# Patient Record
Sex: FEMALE | Race: WHITE | NOT HISPANIC OR LATINO | Employment: UNEMPLOYED | URBAN - METROPOLITAN AREA
[De-identification: names, ages, dates, MRNs, and addresses within clinical notes are randomized per-mention and may not be internally consistent; named-entity substitution may affect disease eponyms.]

---

## 2019-03-26 ENCOUNTER — TELEPHONE (OUTPATIENT)
Dept: GASTROENTEROLOGY | Facility: CLINIC | Age: 34
End: 2019-03-26

## 2021-04-09 RX ORDER — SWAB
1 SWAB, NON-MEDICATED MISCELLANEOUS DAILY
COMMUNITY

## 2021-04-09 RX ORDER — TRAZODONE HYDROCHLORIDE 100 MG/1
100 TABLET ORAL
COMMUNITY

## 2021-04-12 ENCOUNTER — ROUTINE PRENATAL (OUTPATIENT)
Dept: OBGYN CLINIC | Facility: CLINIC | Age: 36
End: 2021-04-12

## 2021-04-12 VITALS
SYSTOLIC BLOOD PRESSURE: 80 MMHG | DIASTOLIC BLOOD PRESSURE: 50 MMHG | HEIGHT: 65 IN | WEIGHT: 124 LBS | BODY MASS INDEX: 20.66 KG/M2

## 2021-04-12 DIAGNOSIS — F32.A DEPRESSION AFFECTING PREGNANCY: ICD-10-CM

## 2021-04-12 DIAGNOSIS — O99.340 DEPRESSION AFFECTING PREGNANCY: ICD-10-CM

## 2021-04-12 DIAGNOSIS — O09.523 MULTIGRAVIDA OF ADVANCED MATERNAL AGE IN THIRD TRIMESTER: Primary | ICD-10-CM

## 2021-04-12 DIAGNOSIS — Z3A.34 34 WEEKS GESTATION OF PREGNANCY: ICD-10-CM

## 2021-04-12 PROBLEM — O98.519 COVID-19 AFFECTING PREGNANCY, ANTEPARTUM: Status: ACTIVE | Noted: 2021-04-12

## 2021-04-12 PROBLEM — O21.0 HYPEREMESIS GRAVIDARUM: Status: ACTIVE | Noted: 2021-04-12

## 2021-04-12 PROBLEM — U07.1 COVID-19 AFFECTING PREGNANCY, ANTEPARTUM: Status: ACTIVE | Noted: 2021-04-12

## 2021-04-12 PROBLEM — O09.529 ADVANCED MATERNAL AGE IN MULTIGRAVIDA: Status: ACTIVE | Noted: 2021-04-12

## 2021-04-12 PROBLEM — Z98.890 HISTORY OF LOOP ELECTROSURGICAL EXCISION PROCEDURE (LEEP): Status: ACTIVE | Noted: 2021-04-12

## 2021-04-12 LAB
SL AMB  POCT GLUCOSE, UA: NEGATIVE
SL AMB POCT URINE PROTEIN: NORMAL

## 2021-04-12 PROCEDURE — PNV: Performed by: STUDENT IN AN ORGANIZED HEALTH CARE EDUCATION/TRAINING PROGRAM

## 2021-04-12 NOTE — ASSESSMENT & PLAN NOTE
- PTL/PPROM/Bleeding precautions given  Kick counts reviewed  - Reviewed plan for collection of GBS swab at 36 weeks    - Problem list updated  - PMH, PSH, medications reviewed and updated as needed  - Return to office in 1-2 wk(s) for routine prenatal care

## 2021-04-12 NOTE — PATIENT INSTRUCTIONS
Pregnancy at 31 to 34 Weeks   AMBULATORY CARE:   What changes are happening to your body: You may continue to have symptoms such as shortness of breath, heartburn, contractions, or swelling of your ankles and feet  You may be gaining about 1 pound a week now  Seek care immediately if:   · You develop a severe headache that does not go away  · You have new or increased vision changes, such as blurred or spotted vision  · You have new or increased swelling in your face or hands  · You have vaginal spotting or bleeding  · Your water broke or you feel warm water gushing or trickling from your vagina  Contact your healthcare provider if:   · You have more than 5 contractions in 1 hour  · You notice any changes in your baby's movements  · You have abdominal cramps, pressure, or tightening  · You have a change in vaginal discharge  · You have chills or a fever  · You have vaginal itching, burning, or pain  · You have yellow, green, white, or foul-smelling vaginal discharge  · You have pain or burning when you urinate, less urine than usual, or pink or bloody urine  · You have questions or concerns about your condition or care  How to care for yourself at this stage of your pregnancy:   · Eat a variety of healthy foods  Healthy foods include fruits, vegetables, whole-grain breads, low-fat dairy foods, beans, lean meats, and fish  Drink liquids as directed  Ask how much liquid to drink each day and which liquids are best for you  Limit caffeine to less than 200 milligrams each day  Limit your intake of fish to 2 servings each week  Choose fish low in mercury such as canned light tuna, shrimp, salmon, cod, or tilapia  Do not  eat fish high in mercury such as swordfish, tilefish, kiran mackerel, and shark  · Manage heartburn  by eating 4 or 5 small meals each day instead of large meals  Avoid spicy food  · Manage swelling  by lying down and putting your feet up  · Take prenatal vitamins as directed  Your need for certain vitamins and minerals, such as folic acid, increases during pregnancy  Prenatal vitamins provide some of the extra vitamins and minerals you need  Prenatal vitamins may also help to decrease the risk of certain birth defects  · Talk to your healthcare provider about exercise  Moderate exercise can help you stay fit  Your healthcare provider will help you plan an exercise program that is safe for you during pregnancy  · Do not smoke  Smoking increases your risk of a miscarriage and other health problems during your pregnancy  Smoking can cause your baby to be born too early or weigh less at birth  Ask your healthcare provider for information if you need help quitting  · Do not drink alcohol  Alcohol passes from your body to your baby through the placenta  It can affect your baby's brain development and cause fetal alcohol syndrome (FAS)  FAS is a group of conditions that causes mental, behavior, and growth problems  · Talk to your healthcare provider before you take any medicines  Many medicines may harm your baby if you take them when you are pregnant  Do not take any medicines, vitamins, herbs, or supplements without first talking to your healthcare provider  Never use illegal or street drugs (such as marijuana or cocaine) while you are pregnant  Safety tips during pregnancy:   · Avoid hot tubs and saunas  Do not use a hot tub or sauna while you are pregnant, especially during your first trimester  Hot tubs and saunas may raise your baby's temperature and increase the risk of birth defects  · Avoid toxoplasmosis  This is an infection caused by eating raw meat or being around infected cat feces  It can cause birth defects, miscarriages, and other problems  Wash your hands after you touch raw meat  Make sure any meat is well-cooked before you eat it  Avoid raw eggs and unpasteurized milk   Use gloves or ask someone else to clean your cat's litter box while you are pregnant  Changes that are happening with your baby:  By 34 weeks, your baby may weigh more than 5 pounds  Your baby will be about 12 ½ inches long from the top of the head to the rump (baby's bottom)  Your baby is gaining about ½ pound a week  Your baby's eyes open and close now  Your baby's kicks and movements are more forceful at this time  What you need to know about prenatal care: Your healthcare provider will check your blood pressure and weight  You may also need the following:  · A urine test  may also be done to check for sugar and protein  These can be signs of gestational diabetes or infection  Protein in your urine may also be a sign of preeclampsia  Preeclampsia is a condition that can develop during week 20 or later of your pregnancy  It causes high blood pressure, and it can cause problems with your kidneys and other organs  · A Tdap vaccine  may be recommended by your healthcare provider  · Fundal height  is a measurement of your uterus to check your baby's growth  This number is usually the same as the number of weeks that you have been pregnant  Your healthcare provider may also check your baby's position  · Your baby's heart rate  will be checked  © Copyright [address] Information is for End User's use only and may not be sold, redistributed or otherwise used for commercial purposes  All illustrations and images included in CareNotes® are the copyrighted property of A D A M , Inc  or [facility] [place] Roxanne Tucker   The above information is an  only  It is not intended as medical advice for individual conditions or treatments  Talk to your doctor, nurse or pharmacist before following any medical regimen to see if it is safe and effective for you

## 2021-04-12 NOTE — PROGRESS NOTES
Routine Prenatal Visit  [address]   [address] [facility], [facility], [place] [address]    Assessment/Plan:  Kurt Silverio is a 29y o  year old  at 34w7d who presents for routine prenatal visit  3  Multigravida of advanced maternal age in third trimester  Assessment & Plan:  - PTL/PPROM/Bleeding precautions given  Kick counts reviewed  - Reviewed plan for collection of GBS swab at 36 weeks  - Problem list updated  - PMH, PSH, medications reviewed and updated as needed  - Return to office in 1-2 wk(s) for routine prenatal care        2  Depression affecting pregnancy    3  34 weeks gestation of pregnancy  -     POCT urine dip      Subjective:   Amelia Guerrero is a 29 y o  [id number] who presents for routine prenatal care at 34w6d  Complaints today: Mild low back pain, constant  LOF: No; VB: No; Contractions: No; FM: Normal    Objective:  BP (!) 80/50   Ht 5' 5" (1 651 m)   Wt 56 2 kg (124 lb)   LMP 2020   BMI 20 63 kg/m²     General: Well appearing, no distress  Respiratory: Unlabored breathing  Cardiovascular: Regular rate  Abdomen: Soft, gravid, nontender  Extremities: Warm and well perfused  Non tender      Pregravid Weight/BMI: 53 1 kg (117 lb) (BMI 19 47)  Current Weight: 56 2 kg (124 lb)   Total Weight Gain: 3 175 kg (7 lb)     Pre-Renny Vitals      Most Recent Value   Prenatal Assessment   Fetal Heart Rate  115   Fundal Height (cm)  34 cm   Movement  Present   Presentation  Vertex   Prenatal Vitals   Blood Pressure  (!) 80/50   Weight - Scale  56 2 kg (124 lb)   Urine Albumin/Glucose   Dilation/Effacement/Station   Vaginal Drainage   Draining Fluid  No   Edema   LLE Edema  None   RLE Edema  None   Facial Edema  None           Delmar Torrez MD  2021 12:59 PM

## 2021-04-21 ENCOUNTER — ROUTINE PRENATAL (OUTPATIENT)
Dept: OBGYN CLINIC | Facility: CLINIC | Age: 36
End: 2021-04-21

## 2021-04-21 VITALS
SYSTOLIC BLOOD PRESSURE: 100 MMHG | WEIGHT: 127 LBS | BODY MASS INDEX: 21.16 KG/M2 | HEIGHT: 65 IN | DIASTOLIC BLOOD PRESSURE: 50 MMHG

## 2021-04-21 DIAGNOSIS — O09.523 MULTIGRAVIDA OF ADVANCED MATERNAL AGE IN THIRD TRIMESTER: ICD-10-CM

## 2021-04-21 DIAGNOSIS — F32.A DEPRESSION AFFECTING PREGNANCY: ICD-10-CM

## 2021-04-21 DIAGNOSIS — O99.340 DEPRESSION AFFECTING PREGNANCY: ICD-10-CM

## 2021-04-21 DIAGNOSIS — O98.519 COVID-19 AFFECTING PREGNANCY, ANTEPARTUM: ICD-10-CM

## 2021-04-21 DIAGNOSIS — Z98.890 HISTORY OF LOOP ELECTROSURGICAL EXCISION PROCEDURE (LEEP): ICD-10-CM

## 2021-04-21 DIAGNOSIS — O21.0 HYPEREMESIS GRAVIDARUM: ICD-10-CM

## 2021-04-21 DIAGNOSIS — U07.1 COVID-19 AFFECTING PREGNANCY, ANTEPARTUM: ICD-10-CM

## 2021-04-21 DIAGNOSIS — Z34.93 PRENATAL CARE IN THIRD TRIMESTER: ICD-10-CM

## 2021-04-21 DIAGNOSIS — Z3A.36 36 WEEKS GESTATION OF PREGNANCY: Primary | ICD-10-CM

## 2021-04-21 LAB
SL AMB  POCT GLUCOSE, UA: NEGATIVE
SL AMB POCT URINE PROTEIN: NORMAL

## 2021-04-21 PROCEDURE — PNV: Performed by: OBSTETRICS & GYNECOLOGY

## 2021-04-21 NOTE — PROGRESS NOTES
Routine Prenatal Visit  [address]   [address] [facility], [facility], [place]    Assessment/Plan:  Vinita Frazier is a 29y o  year old  at 36w1d who presents for routine prenatal visit  1  36 weeks gestation of pregnancy  -     POCT urine dip  -     Strep B DNA probe, amplification    2  Multigravida of advanced maternal age in third trimester    3  Hyperemesis gravidarum    4  COVID-19 affecting pregnancy, antepartum    5  Depression affecting pregnancy    6  History of loop electrosurgical excision procedure (LEEP)    7  Prenatal care in third trimester          Subjective:     CC: Prenatal care    Abilio Amador is a 29 y o   female who presents for routine prenatal care at 36w1d  Pregnancy ROS: No leakage of fluid, pelvic pain, or vaginal bleeding  Nml fetal movement  The following portions of the patient's history were reviewed and updated as appropriate: allergies, current medications, past family history, past medical history, obstetric history, gynecologic history, past social history, past surgical history and problem list       Objective:  /50   Ht 5' 5" (1 651 m)   Wt 57 6 kg (127 lb)   LMP 2020   BMI 21 13 kg/m²   Pregravid Weight/BMI: 53 1 kg (117 lb) (BMI 19 47)  Current Weight: 57 6 kg (127 lb)   Total Weight Gain: 4 536 kg (10 lb)   Pre- Vitals      Most Recent Value   Prenatal Assessment   Fetal Heart Rate  140   Fundal Height (cm)  36 cm   Movement  Present   Presentation  Vertex   Prenatal Vitals   Blood Pressure  100/50   Weight - Scale  57 6 kg (127 lb)   Urine Albumin/Glucose   Dilation/Effacement/Station   Cervical Dilation  0   Cervical Effacement  0   Fetal Station  -1   Vaginal Drainage   Draining Fluid  No   Edema           General: Well appearing, no distress  Abdomen: Soft, gravid, nontender  Fundal Height: Appropriate for gestational age  Extremities: Warm and well perfused  Non tender

## 2021-04-23 LAB — GP B STREP DNA SPEC QL NAA+PROBE: NEGATIVE

## 2021-04-28 ENCOUNTER — ROUTINE PRENATAL (OUTPATIENT)
Dept: OBGYN CLINIC | Facility: CLINIC | Age: 36
End: 2021-04-28

## 2021-04-28 VITALS
DIASTOLIC BLOOD PRESSURE: 58 MMHG | BODY MASS INDEX: 20.83 KG/M2 | WEIGHT: 125 LBS | HEIGHT: 65 IN | SYSTOLIC BLOOD PRESSURE: 90 MMHG

## 2021-04-28 DIAGNOSIS — O21.0 HYPEREMESIS GRAVIDARUM: ICD-10-CM

## 2021-04-28 DIAGNOSIS — O09.523 MULTIGRAVIDA OF ADVANCED MATERNAL AGE IN THIRD TRIMESTER: ICD-10-CM

## 2021-04-28 DIAGNOSIS — U07.1 COVID-19 AFFECTING PREGNANCY, ANTEPARTUM: ICD-10-CM

## 2021-04-28 DIAGNOSIS — Z3A.37 37 WEEKS GESTATION OF PREGNANCY: Primary | ICD-10-CM

## 2021-04-28 DIAGNOSIS — O99.340 DEPRESSION AFFECTING PREGNANCY: ICD-10-CM

## 2021-04-28 DIAGNOSIS — O98.519 COVID-19 AFFECTING PREGNANCY, ANTEPARTUM: ICD-10-CM

## 2021-04-28 DIAGNOSIS — F32.A DEPRESSION AFFECTING PREGNANCY: ICD-10-CM

## 2021-04-28 DIAGNOSIS — Z98.890 HISTORY OF LOOP ELECTROSURGICAL EXCISION PROCEDURE (LEEP): ICD-10-CM

## 2021-04-28 LAB
SL AMB  POCT GLUCOSE, UA: NEGATIVE
SL AMB POCT URINE PROTEIN: NORMAL

## 2021-04-28 PROCEDURE — PNV: Performed by: OBSTETRICS & GYNECOLOGY

## 2021-04-28 NOTE — PROGRESS NOTES
Routine Prenatal Visit  [address], [address], [facility], [address] [facility]    Assessment/Plan:  Jessica Richardson is a 29y o  year old  at 37w1d who presents for routine prenatal visit  1  37 weeks gestation of pregnancy  -     POCT urine dip    2  Multigravida of advanced maternal age in third trimester    3  Hyperemesis gravidarum    4  COVID-19 affecting pregnancy, antepartum    5  Depression affecting pregnancy    6  History of loop electrosurgical excision procedure (LEEP)          Subjective:     CC: Prenatal care    Nani Ellison is a 29 y o   female who presents for routine prenatal care at 37w1d  Pregnancy ROS: No leakage of fluid, pelvic pain, or vaginal bleeding  Nml fetal movement  The following portions of the patient's history were reviewed and updated as appropriate: allergies, current medications, past family history, past medical history, obstetric history, gynecologic history, past social history, past surgical history and problem list       Objective:  BP 90/58   Ht 5' 5" (1 651 m)   Wt 56 7 kg (125 lb)   LMP 2020   BMI 20 80 kg/m²   Pregravid Weight/BMI: 53 1 kg (117 lb) (BMI 19 47)  Current Weight: 56 7 kg (125 lb)   Total Weight Gain: 3 629 kg (8 lb)   Pre- Vitals      Most Recent Value   Prenatal Assessment   Fetal Heart Rate  140-150   Fundal Height (cm)  37 cm   Movement  Present   Prenatal Vitals   Blood Pressure  90/58   Weight - Scale  56 7 kg (125 lb)   Urine Albumin/Glucose   Dilation/Effacement/Station   Cervical Dilation  1   Cervical Effacement  50   Fetal Station  -1   Vaginal Drainage   Draining Fluid  No   Edema           General: Well appearing, no distress  Abdomen: Soft, gravid, nontender  Fundal Height: Appropriate for gestational age  Extremities: Warm and well perfused  Non tender

## 2021-05-03 ENCOUNTER — TELEPHONE (OUTPATIENT)
Dept: OBGYN CLINIC | Facility: CLINIC | Age: 36
End: 2021-05-03

## 2021-05-03 ENCOUNTER — DOCUMENTATION (OUTPATIENT)
Dept: OBGYN CLINIC | Facility: CLINIC | Age: 36
End: 2021-05-03

## 2021-05-03 DIAGNOSIS — Z98.890 HISTORY OF LOOP ELECTROSURGICAL EXCISION PROCEDURE (LEEP): ICD-10-CM

## 2021-05-03 DIAGNOSIS — O98.519 COVID-19 AFFECTING PREGNANCY, ANTEPARTUM: ICD-10-CM

## 2021-05-03 DIAGNOSIS — F32.A DEPRESSION AFFECTING PREGNANCY: ICD-10-CM

## 2021-05-03 DIAGNOSIS — U07.1 COVID-19 AFFECTING PREGNANCY, ANTEPARTUM: ICD-10-CM

## 2021-05-03 DIAGNOSIS — O21.0 HYPEREMESIS GRAVIDARUM: ICD-10-CM

## 2021-05-03 DIAGNOSIS — O99.340 DEPRESSION AFFECTING PREGNANCY: ICD-10-CM

## 2021-05-03 DIAGNOSIS — O09.523 MULTIGRAVIDA OF ADVANCED MATERNAL AGE IN THIRD TRIMESTER: ICD-10-CM

## 2021-05-03 NOTE — TELEPHONE ENCOUNTER
Dr Malik Mora request this nurse to please fax to L/D at [facility] the OB delivery consent form  Faxed as requested with fax confirmation received

## 2021-05-03 NOTE — TELEPHONE ENCOUNTER
Patient called stating over the weekend she lost her mucus plug, then this morning she had some blood spotting with a lot of watery discharge (denies urine odor)  She states she having pelvic pressure and abdominal period time cramping that radiates to her lower back  Pt report good fetal movement  She did not call the on-call provider but was not sure if she should head to labor and delivery or not  Pt is currently 37 6 wks and   Advised pt to please proceed to the L/D unit at [facility] for further evaluation  Pt states she will have to find  first and will be there in about an hour  Called L/D at [facility] and spoke with a  and provided her with report  Epic pt's pregnancy episode printed and faxed to L/D at [facility] with fax confirmation

## 2021-05-05 ENCOUNTER — DOCUMENTATION (OUTPATIENT)
Dept: OBGYN CLINIC | Facility: CLINIC | Age: 36
End: 2021-05-05

## 2021-05-05 ENCOUNTER — ROUTINE PRENATAL (OUTPATIENT)
Dept: OBGYN CLINIC | Facility: CLINIC | Age: 36
End: 2021-05-05

## 2021-05-05 ENCOUNTER — TELEPHONE (OUTPATIENT)
Dept: OBGYN CLINIC | Facility: CLINIC | Age: 36
End: 2021-05-05

## 2021-05-05 VITALS
SYSTOLIC BLOOD PRESSURE: 100 MMHG | HEIGHT: 65 IN | WEIGHT: 123 LBS | BODY MASS INDEX: 20.49 KG/M2 | DIASTOLIC BLOOD PRESSURE: 62 MMHG

## 2021-05-05 DIAGNOSIS — O99.340 DEPRESSION AFFECTING PREGNANCY: ICD-10-CM

## 2021-05-05 DIAGNOSIS — O98.519 COVID-19 AFFECTING PREGNANCY, ANTEPARTUM: ICD-10-CM

## 2021-05-05 DIAGNOSIS — F32.A DEPRESSION AFFECTING PREGNANCY: ICD-10-CM

## 2021-05-05 DIAGNOSIS — Z98.890 HISTORY OF LOOP ELECTROSURGICAL EXCISION PROCEDURE (LEEP): ICD-10-CM

## 2021-05-05 DIAGNOSIS — O21.0 HYPEREMESIS GRAVIDARUM: ICD-10-CM

## 2021-05-05 DIAGNOSIS — O09.523 MULTIGRAVIDA OF ADVANCED MATERNAL AGE IN THIRD TRIMESTER: ICD-10-CM

## 2021-05-05 DIAGNOSIS — U07.1 COVID-19 AFFECTING PREGNANCY, ANTEPARTUM: ICD-10-CM

## 2021-05-05 DIAGNOSIS — Z3A.38 38 WEEKS GESTATION OF PREGNANCY: Primary | ICD-10-CM

## 2021-05-05 LAB
SL AMB  POCT GLUCOSE, UA: NEGATIVE
SL AMB POCT URINE PROTEIN: NEGATIVE

## 2021-05-05 PROCEDURE — PNV: Performed by: OBSTETRICS & GYNECOLOGY

## 2021-05-05 NOTE — PROGRESS NOTES
Patient seen and evaluated by me at [place] [address] and delivery for labor triage  She is a 28-year-old  at 38 weeks 1 day gestation presenting with irregular correct contractions throughout the day  She reports subjectively normal fetal movement  She denies leaking of fluid or vaginal bleeding  Patient was seen for routine prenatal care this morning in the office, at which time cervical exam was performed and patient was noted to be 4 cm dilated 80% effaced -2 station with membranes intact  She states that subsequent to her visit she had increase in frequency and intensity of contractions and therefore presents for labor evaluation  At the time of my initial evaluation at around 2:00 p m , cervical exam was unchanged from exam in the office 4 hours prior  The patient was monitored continuously for 3 hours over which time course she continued to have irregular contractions on the monitor  Repeat cervical exam thereafter revealed cervix unchanged  Patient therefore ruled out for labor with stable cervical exam over >7 hours  Given gestational age prior to 43 weeks, expected management is recommended  The patient was deemed stable for discharge home  Strict return precautions were reviewed, including instructions to call if contractions increase in regularity /frequency/ intensity, if onset of leaking of fluid should occur, or if patient should have vaginal bleeding  Kick counts reviewed  All questions answered  Patient agreeable to plan      Leroy Romo MD  2021 7:34 PM

## 2021-05-05 NOTE — TELEPHONE ENCOUNTER
Remi Mobley states was examined by Dr Jamaal Abad this morning  Remi Mobley said was 4cm dilated & the baby's head was very low  She said was advised to call right away once started to have contractions due to baby's head position  Contreras called c/o contractions every 4 5-5 minutes apart  Advised Contreras to go to [facility] L&D  Tien Mobley verbally agreed with plan  Informed [facility] L&D of pt  Symptoms & arrival  Remi Mobley was awaiting on  & in-laws for

## 2021-05-05 NOTE — PROGRESS NOTES
Routine Prenatal Visit  [address]   [address] [facility], [facility], [address]    Assessment/Plan:  Karyn Ribeiro is a 29y o  year old  at 43w4d who presents for routine prenatal visit  Pt states she has been having irregular CTXs  Was evaluated for LOF on 5/3/21 on L&D, denies having LOF since exam on Monday    1  38 weeks gestation of pregnancy  -     POCT urine dip   Si/sx of labor reviewed  Precautions given  2  Multigravida of advanced maternal age in third trimester    3  Hyperemesis gravidarum    4  COVID-19 affecting pregnancy, antepartum    5  Depression affecting pregnancy    6  History of loop electrosurgical excision procedure (LEEP)          Subjective:     CC: Prenatal care    Charito Langston is a 29 y o   female who presents for routine prenatal care at 38w1d  Pregnancy ROS: No leakage of fluid, pelvic pain, or vaginal bleeding  Nml fetal movement      The following portions of the patient's history were reviewed and updated as appropriate: allergies, current medications, past family history, past medical history, obstetric history, gynecologic history, past social history, past surgical history and problem list       Objective:  /62   Ht 5' 5" (1 651 m)   Wt 55 8 kg (123 lb)   LMP 2020   BMI 20 47 kg/m²   Pregravid Weight/BMI: 53 1 kg (117 lb) (BMI 19 47)  Current Weight: 55 8 kg (123 lb)   Total Weight Gain: 2 722 kg (6 lb)   Pre-Renny Vitals      Most Recent Value   Prenatal Assessment   Fetal Heart Rate  140   Fundal Height (cm)  38 cm   Movement  Present   Presentation  Vertex   Prenatal Vitals   Blood Pressure  100/62   Weight - Scale  55 8 kg (123 lb)   Urine Albumin/Glucose   Dilation/Effacement/Station   Cervical Dilation  3   Cervical Effacement  70   Fetal Station  0   Vaginal Drainage   Draining Fluid  No   Edema           General: Well appearing, no distress  Abdomen: Soft, gravid, nontender  Fundal Height: Appropriate for gestational age   Extremities: Warm and well perfused  Non tender

## 2021-05-06 ENCOUNTER — DOCUMENTATION (OUTPATIENT)
Dept: OBGYN CLINIC | Facility: CLINIC | Age: 36
End: 2021-05-06

## 2021-05-06 DIAGNOSIS — U07.1 COVID-19 AFFECTING PREGNANCY, ANTEPARTUM: ICD-10-CM

## 2021-05-06 DIAGNOSIS — Z98.890 HISTORY OF LOOP ELECTROSURGICAL EXCISION PROCEDURE (LEEP): ICD-10-CM

## 2021-05-06 DIAGNOSIS — F32.A DEPRESSION AFFECTING PREGNANCY: ICD-10-CM

## 2021-05-06 DIAGNOSIS — O09.523 MULTIGRAVIDA OF ADVANCED MATERNAL AGE IN THIRD TRIMESTER: Primary | ICD-10-CM

## 2021-05-06 DIAGNOSIS — O98.519 COVID-19 AFFECTING PREGNANCY, ANTEPARTUM: ICD-10-CM

## 2021-05-06 DIAGNOSIS — O99.340 DEPRESSION AFFECTING PREGNANCY: ICD-10-CM

## 2021-05-06 DIAGNOSIS — O21.0 HYPEREMESIS GRAVIDARUM: ICD-10-CM

## 2021-05-06 NOTE — PROGRESS NOTES
Patient admitted to [facility] in spontaneous active labor at 38 weeks 2 days gestation and subsequently delivered via spontaneous vaginal delivery at 6:02 a m  on May 6th  Live female infant with Apgars of 8 and 9  No laceration      Charmain Lundborg, MD  5/6/2021 7:49 AM

## 2021-06-16 ENCOUNTER — POSTPARTUM VISIT (OUTPATIENT)
Dept: OBGYN CLINIC | Facility: CLINIC | Age: 36
End: 2021-06-16
Payer: COMMERCIAL

## 2021-06-16 VITALS
SYSTOLIC BLOOD PRESSURE: 94 MMHG | DIASTOLIC BLOOD PRESSURE: 54 MMHG | HEIGHT: 65 IN | WEIGHT: 114.8 LBS | BODY MASS INDEX: 19.13 KG/M2

## 2021-06-16 PROCEDURE — 99024 POSTOP FOLLOW-UP VISIT: CPT | Performed by: STUDENT IN AN ORGANIZED HEALTH CARE EDUCATION/TRAINING PROGRAM

## 2021-06-16 PROCEDURE — 96127 BRIEF EMOTIONAL/BEHAV ASSMT: CPT | Performed by: STUDENT IN AN ORGANIZED HEALTH CARE EDUCATION/TRAINING PROGRAM

## 2021-06-16 NOTE — PROGRESS NOTES
[address] [facility], [address], [address] [facility]    Assessment/Plan:  Kurt Silverio is a 29y o  year old [id number] who presents for postpartum visit  Routine Postpartum Care  1  Normal postpartum exam  2  Contraception: none for now, but discussed options  3  Depression Screen: Negative (PHQ-9 score of 0)  4  Feeding: Breast  5  Cervical cancer screening Up to Date  6  Follow up in: 3 months for annual exam or as needed  Additional Problems:  1  Postpartum examination following vaginal delivery          Subjective:     CC: Postpartum visit    Hector Talley is a 29 y o  y o  female [id number] who presents for a postpartum visit  She is 6 weeks postpartum following a spontaneous vaginal delivery on 21 at 38 weeks  Postpartum course has been  Baby's course has been uncomplicated  Baby is feeding by breast      Bleeding no bleeding  Bowel function is normal  Bladder function is normal  Patient is not yet sexually active  Contraception method is none  Postpartum depression screening: negative  The following portions of the patient's history were reviewed and updated as appropriate: allergies, current medications, past family history, past medical history, obstetric history, gynecologic history, past social history, past surgical history and problem list     Objective:  BP 94/54   Ht 5' 5" (1 651 m)   Wt 52 1 kg (114 lb 12 8 oz)   LMP 2020   Breastfeeding Yes   BMI 19 10 kg/m²   Pregravid Weight/BMI: 53 1 kg (117 lb) (BMI 19 47)  Current Weight: 52 1 kg (114 lb 12 8 oz)   Total Weight Gain: 2 722 kg (6 lb)   Pre-Renny Vitals      Most Recent Value   Prenatal Assessment   Prenatal Vitals   Blood Pressure  94/54   Weight - Scale  52 1 kg (114 lb 12 8 oz)   Urine Albumin/Glucose   Dilation/Effacement/Station   Vaginal Drainage   Edema           Chaperone present? Yes: Candice Alexander  General Appearance: alert and oriented, in no acute distress     Abdomen: Soft, non-tender, non-distended, no masses, no rebound or guarding  Pelvic:       External genitalia: Normal appearance, no abnormal pigmentation, no lesions or masses  Normal [place]th[place]'s and [place]'s  Urinary system: Urethral meatus normal, bladder non-tender  Vaginal: normal mucosa without prolapse or lesions  Normal-appearing physiologic discharge  Cervix: Normal-appearing, well-epithelialized, no gross lesions or masses  No cervical motion tenderness  Adnexa: No adnexal masses or tenderness noted  Uterus: Normal-sized, regular contour, midline, mobile, no uterine tenderness  Extremities: Normal range of motion     Skin: normal, no rash or abnormalities  Neurologic: alert, oriented x3  Psychiatric: Appropriate affect, mood stable, cooperative with exam       Dilip Gonzalez MD  6/16/2021 1:02 PM

## 2021-07-13 ENCOUNTER — TELEPHONE (OUTPATIENT)
Dept: OBGYN CLINIC | Facility: CLINIC | Age: 36
End: 2021-07-13

## 2021-07-13 NOTE — TELEPHONE ENCOUNTER
[address] [facility] [employer] contacted office to have Breast Pump order be faxed to [phone]  Dx Z [phone]

## 2021-07-13 NOTE — TELEPHONE ENCOUNTER
Contreras erickson asking for nurse to call her DME to confirm she delivered in order to obtain her breast pump  Returned call to [employer]  This will be her first breast pump for this pregnancy  Recommended she contact DME and request they forward breast pump request to [facility] for completion  Patient verbalized agreement

## 2021-07-14 NOTE — TELEPHONE ENCOUNTER
Left message on Contreras's voice mail of received faxed confirmation of Breast Pump order to [phone]

## 2021-09-07 ENCOUNTER — VBI (OUTPATIENT)
Dept: ADMINISTRATIVE | Facility: OTHER | Age: 36
End: 2021-09-07

## 2021-09-07 NOTE — TELEPHONE ENCOUNTER
Upon review of the In Basket request we were able to locate, review, and update the patient chart as requested for Pap Smear (HPV) aka Cervical Cancer Screening  Any additional questions or concerns should be emailed to the Practice Liaisons via [email or web address] email, please do not reply via In Basket      Thank you  Camacho Nolasco

## 2022-08-11 ENCOUNTER — TELEPHONE (OUTPATIENT)
Dept: GASTROENTEROLOGY | Facility: CLINIC | Age: 37
End: 2022-08-11

## 2022-08-11 NOTE — TELEPHONE ENCOUNTER
Ret'd call to Trisha/Cinthya; left VMM confirming receipt of request; will be faxing over last 2 OV notes & procedure  Records fax'd 8/11/22

## 2022-08-11 NOTE — TELEPHONE ENCOUNTER
Patients GI provider:  Dr Oksana Guerrero    Number to return call: Carmencita Mckeon- [phone]    Reason for call: [place] [facility] specialist calling for records on this patient  Office notes, procedures, and any labs/ pathology reports  Please fax to [phone]     Scheduled procedure/appointment date if applicable: n/a

## 2022-08-25 ENCOUNTER — TELEPHONE (OUTPATIENT)
Dept: GASTROENTEROLOGY | Facility: CLINIC | Age: 37
End: 2022-08-25

## 2022-08-25 ENCOUNTER — OFFICE VISIT (OUTPATIENT)
Dept: GASTROENTEROLOGY | Facility: CLINIC | Age: 37
End: 2022-08-25
Payer: COMMERCIAL

## 2022-08-25 VITALS
SYSTOLIC BLOOD PRESSURE: 106 MMHG | BODY MASS INDEX: 17.79 KG/M2 | HEIGHT: 65 IN | WEIGHT: 106.8 LBS | DIASTOLIC BLOOD PRESSURE: 76 MMHG

## 2022-08-25 DIAGNOSIS — K51.30 ULCERATIVE RECTOSIGMOIDITIS WITHOUT COMPLICATION (HCC): Primary | ICD-10-CM

## 2022-08-25 DIAGNOSIS — D50.9 IRON DEFICIENCY ANEMIA, UNSPECIFIED IRON DEFICIENCY ANEMIA TYPE: ICD-10-CM

## 2022-08-25 PROCEDURE — 99203 OFFICE O/P NEW LOW 30 MIN: CPT | Performed by: REGISTERED NURSE

## 2022-08-25 RX ORDER — SODIUM PICOSULFATE, MAGNESIUM OXIDE, AND ANHYDROUS CITRIC ACID 10; 3.5; 12 MG/160ML; G/160ML; G/160ML
LIQUID ORAL
Qty: 320 ML | Refills: 0 | Status: SHIPPED | OUTPATIENT
Start: 2022-08-25 | End: 2022-08-26

## 2022-08-25 RX ORDER — PREDNISONE 10 MG/1
TABLET ORAL
Qty: 90 TABLET | Refills: 0 | Status: SHIPPED | OUTPATIENT
Start: 2022-08-25 | End: 2022-09-29

## 2022-08-25 RX ORDER — DIBASIC SODIUM PHOSPHATE, MONOBASIC POTASSIUM PHOSPHATE AND MONOBASIC SODIUM PHOSPHATE 852; 155; 130 MG/1; MG/1; MG/1
TABLET ORAL DAILY
COMMUNITY
Start: 2022-08-24

## 2022-08-25 NOTE — TELEPHONE ENCOUNTER
Scheduled date of colonoscopy (as of today): 9/6/2022  Physician performing colonoscopy:  Dr Alec Hunter    Location of colonoscopy: [facility]  Bowel prep reviewed with patient: Clenpiq  Instructions reviewed with patient by:PD  Clearances: H and H needed

## 2022-08-25 NOTE — PROGRESS NOTES
[address] [facility] - Outpatient Consultation  Emanuel Zazueta 40 y o  female MRN: [id number]  Encounter: [id number]    ASSESSMENT AND PLAN:      1  Ulcerative rectosigmoiditis without complication ([address] )  Diagnosed in 2013  Previously maintained on mesalamine however has not been on medication for years  No flares since 2016 up until about a month ago with 5-6 episodes of loose stools per day  Also noted some bright red blood mixed in with the stool  Intermittent abdominal cramping  Discussed prednisone taper and she is aware that she is overdue for colonoscopy and will proceed at this time  Will discuss maintenance medications following colonoscopy  She is up-to-date on gyn exam dermatology exam   Up-to-date on vaccines except for not vaccinated against COVID-19    - predniSONE 10 mg tablet; Take 4 tablets (40 mg total) by mouth daily for 14 days, THEN 3 tablets (30 mg total) daily for 7 days, THEN 2 tablets (20 mg total) daily for 7 days, THEN 1 tablet (10 mg total) daily for 7 days  Four pills daily for 1 week, taper by 1 pill each week  Dispense: 90 tablet; Refill: 0  - C-reactive protein  - Sedimentation rate, automated  - Colonoscopy; Future    2  Iron deficiency anemia, unspecified iron deficiency anemia type  Iron deficiency possibly related to ulcerative colitis  Failure to replete iron with tablets  She is now receiving IV iron infusions  She is also concerned for celiac disease so we will check blood work  - Celiac Disease Antibody Profile      Followup Appointment:  2 months  ______________________________________________________________________    Chief Complaint   Patient presents with    Ulcerative Colitis      [address]  Nausea, blood in stool, diarrhea, loss of appetite  HPI:   Emanuel Zazueta is a 40y o  year old female who presents with possible ulcerative colitis flare  She was 1st diagnosed in 2013    Next flare after that was in 2016 while she was pregnant  She had C diff and UC flare and spent 3 weeks in the hospital   She was started on mesalamine at that time  She had a sigmoidoscopy in 2016 and was due for a recall in 2019 however never did complete  Last office visit was 2017 for which at that time she was no longer taking her mesalamine  She was clinically in remission  She did stop the medication due to breast-feeding  She has now finished breast-feeding her last child and feels as though she may be having a flare again  Of note she has been receiving iron infusions for anemia and has had decreased appetite as well as nausea  She was getting p o  iron however it was not absorbing properly  She states that she is having about 5-6 loose bowel movements per day  This started about 1 month ago  Also with bright red blood noted mixed within the stool  She has had some abdominal cramping however she also was experiencing menses  As of today she is not having any abdominal cramping  She denies any upper GI symptoms such as GERD, however again she is experiencing intermittent nausea as well as decreased appetite  Unsure if this is related to a flare for the iron at this time  Will continue to monitor  She does have Zofran p r n      Historical Information   Past Medical History:   Diagnosis Date    Anemia     Anxiety     managed by PCP    C  difficile colitis 2016    during pregnancy    LGSIL on Pap smear of cervix     had leep 2016    Papanicolaou smear 09/25/2020    Ulcerative colitis ([facility] [address] )     Dr Shruthi Staton     Past Surgical History:   Procedure Laterality Date    CERVICAL BIOPSY  W/ LOOP ELECTRODE EXCISION  2016    WISDOM TOOTH EXTRACTION  2000     Social History     Substance and Sexual Activity   Alcohol Use Never     Social History     Substance and Sexual Activity   Drug Use Never    Comment: No use     Social History     Tobacco Use   Smoking Status Never Smoker   Smokeless Tobacco Never Used     Family History Problem Relation Age of Onset    Heart disease Father     Hypertension Father     Thyroid disease Father     No Known Problems Sister     No Known Problems Sister     No Known Problems Sister     No Known Problems Sister     No Known Problems Sister     Colon polyps Brother     No Known Problems Brother     No Known Problems Daughter     No Known Problems Son     Colon cancer Neg Hx        Meds/Allergies     Current Outpatient Medications:     patient supplied medication    predniSONE 10 mg tablet    traZODone (DESYREL) 100 mg tablet    Ferrous Sulfate  (45 Fe) MG TBCR    K Phos [place]-Sod Phos Di & Mono (Phospha 250 Neutral) [phone] MG TABS    Prenatal Vit-Fe Fumarate-FA (prenatal multivitamin) 28-0 8 MG TABS    sertraline (ZOLOFT) 50 mg tablet    Sod Picosulfate-Mag Ox-Cit Acd (Clenpiq) 10-3 5-12 MG-GM -GM/160ML SOLN    Allergies   Allergen Reactions    Amoxicillin Other (See Comments)     C-diff, per pt    Ibuprofen Other (See Comments)     Ulcerative colitis, per pt       PHYSICAL EXAM:    Blood pressure 106/76, height 5' 4 5" (1 638 m), weight 48 4 kg (106 lb 12 8 oz), currently breastfeeding  Body mass index is 18 05 kg/m²  General Appearance: NAD, cooperative, alert  Eyes: Anicteric, PERRLA, EOMI  ENT:  Normocephalic, atraumatic, normal mucosa  Neck:  Supple, symmetrical, trachea midline,   Resp:  Clear to auscultation bilaterally; no rales, rhonchi or wheezing; respirations unlabored   CV:  S1 S2, Regular rate and rhythm; no murmur, rub, or gallop  GI:  Soft, non-tender, non-distended; normal bowel sounds; no masses, no organomegaly   Rectal: Deferred  Musculoskeletal: No cyanosis, clubbing or edema  Normal ROM    Skin:  No jaundice, rashes, or lesions   Heme/Lymph: No palpable cervical lymphadenopathy  Psych: Normal affect, good eye contact  Neuro: No gross deficits, AAOx3    Lab Results:   No results found for: WBC, HGB, HCT, MCV, PLT  No results found for: NA, K, CL, CO2, ANIONGAP, BUN, CREATININE, GLUCOSE, GLUF, CALCIUM, CORRECTEDCA, AST, ALT, ALKPHOS, PROT, BILITOT, EGFR  No results found for: IRON, TIBC, FERRITIN  No results found for: LIPASE    Radiology Results:   No results found  REVIEW OF SYSTEMS:    CONSTITUTIONAL: Denies any fever, chills, rigors, and weight loss  HEENT: No earache or tinnitus  Denies hearing loss or visual disturbances  CARDIOVASCULAR: No chest pain or palpitations  RESPIRATORY: Denies any cough, hemoptysis, shortness of breath or dyspnea on exertion  GASTROINTESTINAL: As noted in the History of Present Illness  GENITOURINARY: No problems with urination  Denies any hematuria or dysuria  NEUROLOGIC: No dizziness or vertigo, denies headaches  MUSCULOSKELETAL: Denies any muscle or joint pain  SKIN: Denies skin rashes or itching  ENDOCRINE: Denies excessive thirst  Denies intolerance to heat or cold  PSYCHOSOCIAL: Denies depression or anxiety  Denies any recent memory loss  Answers for HPI/ROS submitted by the patient on 8/23/2022  Chronicity: chronic  Onset: more than 1 year ago  Onset quality: gradual  Frequency: daily  Progression since onset: gradually worsening  Pain location: generalized abdominal region  Pain - numeric: 5/10  Pain quality: cramping  Radiates to: periumbilical region  anorexia: Yes  arthralgias: Yes  belching: No  constipation: No  diarrhea: Yes  dysuria: No  fever: Yes  flatus: Yes  frequency: Yes  headaches: Yes  hematochezia: Yes  hematuria: No  melena:  Yes  myalgias: Yes  nausea: Yes  weight loss: Yes  vomiting: No  Aggravated by: NSAIDs  Relieved by: nothing, recumbency

## 2022-08-26 ENCOUNTER — PATIENT MESSAGE (OUTPATIENT)
Dept: GASTROENTEROLOGY | Facility: CLINIC | Age: 37
End: 2022-08-26

## 2022-08-26 ENCOUNTER — TELEPHONE (OUTPATIENT)
Dept: GASTROENTEROLOGY | Facility: CLINIC | Age: 37
End: 2022-08-26

## 2022-08-26 ENCOUNTER — OFFICE VISIT (OUTPATIENT)
Dept: OBGYN CLINIC | Facility: CLINIC | Age: 37
End: 2022-08-26

## 2022-08-26 VITALS
DIASTOLIC BLOOD PRESSURE: 58 MMHG | WEIGHT: 104.4 LBS | SYSTOLIC BLOOD PRESSURE: 90 MMHG | BODY MASS INDEX: 17.83 KG/M2 | HEIGHT: 64 IN

## 2022-08-26 DIAGNOSIS — Z12.31 SCREENING MAMMOGRAM FOR BREAST CANCER: ICD-10-CM

## 2022-08-26 DIAGNOSIS — Z12.4 SCREENING FOR CERVICAL CANCER: ICD-10-CM

## 2022-08-26 DIAGNOSIS — Z80.3 FAMILY HISTORY OF BREAST CANCER IN FIRST DEGREE RELATIVE: ICD-10-CM

## 2022-08-26 DIAGNOSIS — Z01.419 WOMEN'S ANNUAL ROUTINE GYNECOLOGICAL EXAMINATION: Primary | ICD-10-CM

## 2022-08-26 RX ORDER — ESCITALOPRAM OXALATE 10 MG/1
TABLET ORAL
COMMUNITY
Start: 2022-02-01

## 2022-08-26 RX ORDER — LORAZEPAM 0.5 MG/1
0.5 TABLET ORAL AS NEEDED
COMMUNITY
Start: 2022-08-12

## 2022-08-26 RX ORDER — ONDANSETRON 4 MG/1
TABLET, ORALLY DISINTEGRATING ORAL AS NEEDED
COMMUNITY
Start: 2022-08-25 | End: 2022-10-19 | Stop reason: ALTCHOICE

## 2022-08-26 NOTE — TELEPHONE ENCOUNTER
Patients GI provider:  Gurmeet Lee    Number to return call: [phone]    Reason for call: Pt called in stating that the clenpiq is not covered  Requesting samples  Above is her number       Scheduled procedure/appointment date if applicable: procedure 4/5/82

## 2022-08-26 NOTE — PROGRESS NOTES
[address] [facility], [address], [facility], [address] [facility]    ASSESSMENT/PLAN: Angela Hagan is a 40 y o  [id number] who presents for annual gynecologic exam     Encounter for routine gynecologic examination  - Routine well woman exam completed today  - Cervical Cancer Screening: Current ASCCP Guidelines reviewed  Last Pap: 09/30/2020  History of abnormal: Had LEEP 2016, returned to normal  ASC-US 2020 with negative HRHPV    - STI screening offered including HIV testing: offered, pt declined  - Contraceptive counseling discussed  Current contraception: Coitus interruptus  Declines all other methods  - The following were reviewed in today's visit: breast self exam, mammography screening ordered, family planning choices, exercise and healthy diet    Additional problems addressed during this visit:  1  Women's annual routine gynecological examination    2  Screening for cervical cancer  -     IGP, Aptima HPV, Rfx 16/18,45    3  Family history of breast cancer in first degree relative  Assessment & Plan:  - Patient's sister recently diagnosed with breast cancer at age 43  Unsure if she was tested for hereditary cancer syndromes  - Recommend initiation of screening mammography now  Patient currently breastfeeding  As such, will obtain baseline exam 6 months after cessation of breastfeeding  Order provided today  - Recommend consultation with Oncology Genetics  Orders:  -     Ambulatory Referral to Oncology Genetics; Future  -     Mammo screening bilateral w 3d & cad; Future; Expected date: 02/26/2023    4  Screening mammogram for breast cancer  -     Ambulatory Referral to Oncology Genetics; Future  -     Mammo screening bilateral w 3d & cad; Future; Expected date: 02/26/2023      CC: Annual Gynecologic Examination    HPI: Angela Hagan is a 40 y o  [id number] who presents for annual gynecologic examination  She is without complaint   Since her last visit with us, her sister has a new diagnosis of breast cancer at age 43  ROS: Negative except as noted in HPI    Patient's last menstrual period was 08/20/2022 (exact date)  Menstrual History  Period Cycle (Days): 28  Period Pattern: Regular  Menstrual Flow: Moderate  Menstrual Control Change Freq (Hours): 4  Dysmenorrhea: None    She  reports being sexually active and has had partner(s) who are male  She reports using the following method of birth control/protection: None    Sexual History  Sexual Assault: No  Sexually Transmitted Infection History: None  Multiple Sex Partners: No  Is Patient in a Monogamous Relationship?: Yes    The following portions of the patient's history were reviewed and updated as appropriate:   Past Medical History:   Diagnosis Date    Anemia     Anxiety     managed by PCP    C  difficile colitis 2016    during pregnancy    LGSIL on Pap smear of cervix     had leep 2016    Ulcerative colitis ([facility] [address] )     Dr Fidelia Blanco     Past Surgical History:   Procedure Laterality Date    CERVICAL BIOPSY  W/ LOOP ELECTRODE EXCISION  2016    WISDOM TOOTH EXTRACTION  2000     Family History   Problem Relation Age of Onset    Heart disease Father     Hypertension Father     Thyroid disease Father     No Known Problems Sister     No Known Problems Sister     No Known Problems Sister     Breast cancer Sister 43    No Known Problems Sister     Colon polyps Brother     No Known Problems Brother     No Known Problems Daughter     No Known Problems Son     Colon cancer Neg Hx     Ovarian cancer Neg Hx     Uterine cancer Neg Hx     Prostate cancer Neg Hx     Pancreatic cancer Neg Hx      Social History     Socioeconomic History    Marital status: /Civil Union     Spouse name: None    Number of children: 2    Years of education: some college    Highest education level: None   Occupational History    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   Tobacco Use    Smoking status: Never Smoker    Smokeless tobacco: Never Used   Vaping Use    Vaping Use: Never used   Substance and Sexual Activity    Alcohol use: Never    Drug use: Never     Comment: No use    Sexual activity: Yes     Partners: Male     Birth control/protection: None   Other Topics Concern    None   Social History Narrative    Exercises weekly     Social Determinants of Health     Financial Resource Strain: Not on file   Food Insecurity: Not on file   Transportation Needs: Not on file   Physical Activity: Not on file   Stress: Not on file   Social Connections: Not on file   Intimate Partner Violence: Not on file   Housing Stability: Not on file     Outpatient Medications Marked as Taking for the 8/26/22 encounter (Office Visit) with Chela Hastings MD   Medication    escitalopram (LEXAPRO) 10 mg tablet    K Phos Mono-Sod Phos Di & Mono (Phospha 250 Neutral) [phone] MG TABS    LORazepam (ATIVAN) 0 5 mg tablet    ondansetron (ZOFRAN-ODT) 4 mg disintegrating tablet    patient supplied medication    predniSONE 10 mg tablet    traZODone (DESYREL) 100 mg tablet     Allergies   Allergen Reactions    Amoxicillin Other (See Comments)     C-diff, per pt    Ibuprofen Other (See Comments)     Ulcerative colitis, per pt           Objective:  BP 90/58   Ht 5' 4 25" (1 632 m)   Wt 47 4 kg (104 lb 6 4 oz)   LMP 08/20/2022 (Exact Date)   Breastfeeding Yes   BMI 17 78 kg/m²        Chaperone present? Yes: Silvia Vang MA  General Appearance: alert and oriented, in no acute distress  Neck/Thyroid: No thyromegaly, no thyroid nodules  Respiratory: Unlabored breathing  Cardiovascular: Regular rate, no peripheral edema  Abdomen: Soft, non-tender, non-distended, no masses, no rebound or guarding  Breast Exam: No dimpling, nipple retraction or discharge  No lumps or masses  No axillary or supraclavicular nodes  Pelvic:       External genitalia: Normal appearance, no abnormal pigmentation, no lesions or masses  Normal Bartholin's and Kings's  Urinary system: Urethral meatus normal, bladder non-tender  Vaginal: normal mucosa without prolapse or lesions  Normal-appearing physiologic discharge  Cervix: Normal-appearing, well-epithelialized, no gross lesions or masses  No cervical motion tenderness  Adnexa: No adnexal masses or tenderness noted  Uterus: Normal-sized, regular contour, midline, mobile, no uterine tenderness  Extremities: Normal range of motion  Warm, well-perfused, non-tender     Skin: normal, no rash or abnormalities  Neurologic: alert, oriented x3  Psychiatric: Appropriate affect, mood stable, cooperative with exam         Sherif Dwyer MD  8/26/2022 12:36 PM

## 2022-08-27 ENCOUNTER — PATIENT MESSAGE (OUTPATIENT)
Dept: GASTROENTEROLOGY | Facility: CLINIC | Age: 37
End: 2022-08-27

## 2022-08-29 LAB
CRP SERPL-MCNC: <1 MG/L (ref 0–10)
ENDOMYSIUM IGA SER QL: NEGATIVE
ERYTHROCYTE [SEDIMENTATION RATE] IN BLOOD BY WESTERGREN METHOD: 2 MM/HR (ref 0–32)
GLIADIN PEPTIDE IGA SER-ACNC: 5 UNITS (ref 0–19)
GLIADIN PEPTIDE IGG SER-ACNC: <1 UNITS (ref 0–19)
IGA SERPL-MCNC: 220 MG/DL (ref 87–352)
TTG IGA SER-ACNC: <2 U/ML (ref 0–3)
TTG IGG SER-ACNC: <2 U/ML (ref 0–5)

## 2022-08-29 NOTE — TELEPHONE ENCOUNTER
Spoke to patient who advised she had CBC done last Tuesday and Hemoglobin was 13 0  She then had her iron infusion  Tried calling [place] for results and they were not open yet

## 2022-08-30 ENCOUNTER — PATIENT MESSAGE (OUTPATIENT)
Dept: GASTROENTEROLOGY | Facility: CLINIC | Age: 37
End: 2022-08-30

## 2022-08-31 ENCOUNTER — TELEPHONE (OUTPATIENT)
Dept: GENETICS | Facility: CLINIC | Age: 37
End: 2022-08-31

## 2022-08-31 NOTE — TELEPHONE ENCOUNTER
I called Guillaume Gomes to schedule a new patient appointment with the Cancer [facility]       Outcome:   I left a voice message encouraging the patient to call the genetics team at [phone] to schedule this appointment  Follow-up:   At this time the referral will be closed and we will wait to hear back from the patient regarding scheduling this appointment

## 2022-09-01 ENCOUNTER — PATIENT MESSAGE (OUTPATIENT)
Dept: GASTROENTEROLOGY | Facility: CLINIC | Age: 37
End: 2022-09-01

## 2022-09-01 ENCOUNTER — TELEPHONE (OUTPATIENT)
Dept: GASTROENTEROLOGY | Facility: CLINIC | Age: 37
End: 2022-09-01

## 2022-09-01 NOTE — TELEPHONE ENCOUNTER
1st call-lvm for pt to resched colon to a combo per Joyce Chamber pt that there is an opening on 9/6 which is the date pt is scheduled for just the colon

## 2022-09-01 NOTE — TELEPHONE ENCOUNTER
----- Message from Adolfo Weathers, [address] sent at 8/29/2022 10:26 AM EDT -----  Regarding: Combo  Can we please change her (Gentrue Speaks) from a colonoscopy to a combo  I told her someone would reach out over the next couple days  Thank you

## 2022-09-02 ENCOUNTER — TELEPHONE (OUTPATIENT)
Dept: LABOR AND DELIVERY | Facility: HOSPITAL | Age: 37
End: 2022-09-02

## 2022-09-02 ENCOUNTER — PATIENT MESSAGE (OUTPATIENT)
Dept: OBGYN CLINIC | Facility: CLINIC | Age: 37
End: 2022-09-02

## 2022-09-02 LAB
CYTOLOGIST CVX/VAG CYTO: ABNORMAL
DX ICD CODE: ABNORMAL
DX ICD CODE: ABNORMAL
HPV I/H RISK 4 DNA CVX QL PROBE+SIG AMP: NEGATIVE
OTHER STN SPEC: ABNORMAL
PATH REPORT.FINAL DX SPEC: ABNORMAL
PATHOLOGIST CVX/VAG CYTO: ABNORMAL
RECOM F/U CVX/VAG CYTO: ABNORMAL
SL AMB NOTE:: ABNORMAL
SL AMB SPECIMEN ADEQUACY: ABNORMAL
SL AMB TEST METHODOLOGY: ABNORMAL

## 2022-09-02 NOTE — TELEPHONE ENCOUNTER
Returned call to patient  Reviewed result of ASC-US with negative HRHPV testing  Discussed that this is not a pre-cancerous result  Patient reassured  Will repeat pap next year, given prior ASC-US result from 2020  Patient agreeable      J Carlos Cabrera MD  9/2/2022 4:51 PM

## 2022-09-02 NOTE — TELEPHONE ENCOUNTER
Patient cannot do later- I had tried offering a later time before and she cannot do this because of  and son schedule

## 2022-09-02 NOTE — PATIENT COMMUNICATION
Patient called again, in tears as she is very nervous and anxious about her report  She had not heard back from Dr Rulon Sever and is worried  Advised pt that Dr Rulon Sever is on call at the hospital, and that her Edicy message was forward to him but Dr Rulon Sever had not had a chance to review it yet  She saw online that her results was highlighted in red and abnormal showing "Atypical Squamous Cells of Undetermined Significance" and she worry it will cause cancer  Explained to patient about ASC-US meaning and that her HPV was negative  But will forward her request to talk to Dr Rulon Sever still to further discuss about the results to ease her mind  Call back number is [phone]  Dr Rulon Sever please advise

## 2022-09-02 NOTE — TELEPHONE ENCOUNTER
----- Message from Yvonne Goel RN sent at 9/2/2022  2:22 PM EDT -----  Regarding: FW: Question regarding IGP, APTIMA HPV, RFX 16/18, 45    ----- Message -----  From: Marleen Mosher  Sent: 9/2/2022   2:13 PM EDT  To: [facility]  Subject: Question regarding IGP, APTIMA HPV, RFX 16/1#    Manjit Wills,    I just received my test results regarding my PAP and it has me concerned  can you please give me a call or send a message regarding this before the weekend to put my mind at ease?      Thank you     Dk White

## 2022-09-06 ENCOUNTER — HOSPITAL ENCOUNTER (OUTPATIENT)
Dept: GASTROENTEROLOGY | Facility: AMBULATORY SURGERY CENTER | Age: 37
Discharge: HOME | End: 2022-09-06
Payer: COMMERCIAL

## 2022-09-06 ENCOUNTER — ANESTHESIA (OUTPATIENT)
Dept: GASTROENTEROLOGY | Facility: AMBULATORY SURGERY CENTER | Age: 37
End: 2022-09-06

## 2022-09-06 ENCOUNTER — ANESTHESIA EVENT (OUTPATIENT)
Dept: GASTROENTEROLOGY | Facility: AMBULATORY SURGERY CENTER | Age: 37
End: 2022-09-06

## 2022-09-06 VITALS
HEART RATE: 70 BPM | OXYGEN SATURATION: 100 % | RESPIRATION RATE: 16 BRPM | SYSTOLIC BLOOD PRESSURE: 109 MMHG | DIASTOLIC BLOOD PRESSURE: 66 MMHG

## 2022-09-06 DIAGNOSIS — K51.30 ULCERATIVE RECTOSIGMOIDITIS WITHOUT COMPLICATION (HCC): ICD-10-CM

## 2022-09-06 PROCEDURE — 88305 TISSUE EXAM BY PATHOLOGIST: CPT | Performed by: STUDENT IN AN ORGANIZED HEALTH CARE EDUCATION/TRAINING PROGRAM

## 2022-09-06 PROCEDURE — 45380 COLONOSCOPY AND BIOPSY: CPT | Performed by: INTERNAL MEDICINE

## 2022-09-06 RX ORDER — MESALAMINE 1.2 G/1
4800 TABLET, DELAYED RELEASE ORAL
Qty: 120 TABLET | Refills: 5 | Status: SHIPPED | OUTPATIENT
Start: 2022-09-06 | End: 2022-09-30

## 2022-09-06 RX ORDER — SODIUM CHLORIDE, SODIUM LACTATE, POTASSIUM CHLORIDE, CALCIUM CHLORIDE 600; 310; 30; 20 MG/100ML; MG/100ML; MG/100ML; MG/100ML
50 INJECTION, SOLUTION INTRAVENOUS CONTINUOUS
Status: DISCONTINUED | OUTPATIENT
Start: 2022-09-06 | End: 2022-09-10 | Stop reason: HOSPADM

## 2022-09-06 RX ORDER — PROPOFOL 10 MG/ML
INJECTION, EMULSION INTRAVENOUS AS NEEDED
Status: DISCONTINUED | OUTPATIENT
Start: 2022-09-06 | End: 2022-09-06

## 2022-09-06 RX ORDER — LIDOCAINE HYDROCHLORIDE 10 MG/ML
INJECTION, SOLUTION EPIDURAL; INFILTRATION; INTRACAUDAL; PERINEURAL AS NEEDED
Status: DISCONTINUED | OUTPATIENT
Start: 2022-09-06 | End: 2022-09-06

## 2022-09-06 RX ADMIN — PROPOFOL 50 MG: 10 INJECTION, EMULSION INTRAVENOUS at 10:43

## 2022-09-06 RX ADMIN — PROPOFOL 40 MG: 10 INJECTION, EMULSION INTRAVENOUS at 10:53

## 2022-09-06 RX ADMIN — PROPOFOL 40 MG: 10 INJECTION, EMULSION INTRAVENOUS at 10:32

## 2022-09-06 RX ADMIN — PROPOFOL 50 MG: 10 INJECTION, EMULSION INTRAVENOUS at 10:40

## 2022-09-06 RX ADMIN — LIDOCAINE HYDROCHLORIDE 50 MG: 10 INJECTION, SOLUTION EPIDURAL; INFILTRATION; INTRACAUDAL; PERINEURAL at 10:30

## 2022-09-06 RX ADMIN — PROPOFOL 100 MG: 10 INJECTION, EMULSION INTRAVENOUS at 10:30

## 2022-09-06 RX ADMIN — SODIUM CHLORIDE, SODIUM LACTATE, POTASSIUM CHLORIDE, CALCIUM CHLORIDE 50 ML/HR: 600; 310; 30; 20 INJECTION, SOLUTION INTRAVENOUS at 10:06

## 2022-09-06 RX ADMIN — PROPOFOL 50 MG: 10 INJECTION, EMULSION INTRAVENOUS at 10:36

## 2022-09-06 RX ADMIN — PROPOFOL 50 MG: 10 INJECTION, EMULSION INTRAVENOUS at 10:50

## 2022-09-06 RX ADMIN — PROPOFOL 50 MG: 10 INJECTION, EMULSION INTRAVENOUS at 10:47

## 2022-09-06 NOTE — ANESTHESIA POSTPROCEDURE EVALUATION
Post-Op Assessment Note    CV Status:  Stable    Pain management: adequate     Mental Status:  Sleepy and arousable   Hydration Status:  Euvolemic   PONV Controlled:  Controlled   Airway Patency:  Patent      Post Op Vitals Reviewed: Yes            No complications documented      BP   92/54   Temp      Pulse  70   Resp   15   SpO2   100

## 2022-09-06 NOTE — ANESTHESIA PREPROCEDURE EVALUATION
Procedure:  COLONOSCOPY    Relevant Problems   GI/HEPATIC  Ulcerative colitiis      GYN   (+) Advanced maternal age in multigravida      NEURO/PSYCH   (+) Depression affecting pregnancy        Physical Exam    Airway    Mallampati score: II  TM Distance: >3 FB  Neck ROM: full     Dental       Cardiovascular  Cardiovascular exam normal    Pulmonary  Pulmonary exam normal     Other Findings        Anesthesia Plan  ASA Score- 2     Anesthesia Type- IV sedation with anesthesia with ASA Monitors  Additional Monitors:   Airway Plan:           Plan Factors-Exercise tolerance (METS): >4 METS  Chart reviewed  Patient summary reviewed  Induction- intravenous  Postoperative Plan-     Informed Consent- Anesthetic plan and risks discussed with patient  I personally reviewed this patient with the CRNA  Discussed and agreed on the Anesthesia Plan with the CRNA  Parmjit Burton

## 2022-09-14 ENCOUNTER — TELEPHONE (OUTPATIENT)
Dept: GASTROENTEROLOGY | Facility: CLINIC | Age: 37
End: 2022-09-14

## 2022-09-14 ENCOUNTER — PREP FOR PROCEDURE (OUTPATIENT)
Dept: GASTROENTEROLOGY | Facility: CLINIC | Age: 37
End: 2022-09-14

## 2022-09-14 DIAGNOSIS — R11.0 NAUSEA: Primary | ICD-10-CM

## 2022-09-14 NOTE — TELEPHONE ENCOUNTER
Scheduled date of EGD(as of today):10/31/2022  Physician performing EGD:Dr Luisa Haile  Location of EGD:[facility]  Instructions reviewed with patient by:denis  Clearances: no

## 2022-09-16 NOTE — RESULT ENCOUNTER NOTE
Discussed with patient, 5 year colonoscopy recall  Symptoms improving on current medication regimen  Keep upcoming office visit

## 2022-09-22 ENCOUNTER — PATIENT MESSAGE (OUTPATIENT)
Dept: GASTROENTEROLOGY | Facility: CLINIC | Age: 37
End: 2022-09-22

## 2022-09-22 DIAGNOSIS — K51.30 ULCERATIVE RECTOSIGMOIDITIS WITHOUT COMPLICATION (HCC): Primary | ICD-10-CM

## 2022-10-03 ENCOUNTER — TELEPHONE (OUTPATIENT)
Dept: GASTROENTEROLOGY | Facility: CLINIC | Age: 37
End: 2022-10-03

## 2022-10-03 NOTE — TELEPHONE ENCOUNTER
Multiple portal encounters about ongoing symptoms and recovery from her Crohn's flare  She has an EGD scheduled 10/31 but asked if we can fit her in for a sooner appointment with me if 1 becomes available  If we find a sooner appointment, please also keep her December appointment with me

## 2022-10-13 LAB
ALBUMIN SERPL-MCNC: 4.5 G/DL (ref 3.8–4.8)
ALBUMIN/GLOB SERPL: 2.8 {RATIO} (ref 1.2–2.2)
ALP SERPL-CCNC: 37 IU/L (ref 44–121)
ALT SERPL-CCNC: 19 IU/L (ref 0–32)
AST SERPL-CCNC: 24 IU/L (ref 0–40)
BILIRUB SERPL-MCNC: 0.3 MG/DL (ref 0–1.2)
BUN SERPL-MCNC: 8 MG/DL (ref 6–20)
BUN/CREAT SERPL: 10 (ref 9–23)
CALCIUM SERPL-MCNC: 9.2 MG/DL (ref 8.7–10.2)
CHLORIDE SERPL-SCNC: 105 MMOL/L (ref 96–106)
CO2 SERPL-SCNC: 23 MMOL/L (ref 20–29)
CREAT SERPL-MCNC: 0.79 MG/DL (ref 0.57–1)
CRP SERPL-MCNC: <1 MG/L (ref 0–10)
EGFR: 99 ML/MIN/1.73
ERYTHROCYTE [SEDIMENTATION RATE] IN BLOOD BY WESTERGREN METHOD: 2 MM/HR (ref 0–32)
GLOBULIN SER-MCNC: 1.6 G/DL (ref 1.5–4.5)
GLUCOSE SERPL-MCNC: 92 MG/DL (ref 70–99)
POTASSIUM SERPL-SCNC: 4.7 MMOL/L (ref 3.5–5.2)
PROT SERPL-MCNC: 6.1 G/DL (ref 6–8.5)
SODIUM SERPL-SCNC: 141 MMOL/L (ref 134–144)

## 2022-10-17 ENCOUNTER — OFFICE VISIT (OUTPATIENT)
Dept: GASTROENTEROLOGY | Facility: CLINIC | Age: 37
End: 2022-10-17
Payer: COMMERCIAL

## 2022-10-17 VITALS
HEIGHT: 65 IN | WEIGHT: 104 LBS | DIASTOLIC BLOOD PRESSURE: 60 MMHG | BODY MASS INDEX: 17.33 KG/M2 | SYSTOLIC BLOOD PRESSURE: 110 MMHG

## 2022-10-17 DIAGNOSIS — R53.83 OTHER FATIGUE: ICD-10-CM

## 2022-10-17 DIAGNOSIS — K51.011 ULCERATIVE PANCOLITIS WITH RECTAL BLEEDING (HCC): Primary | ICD-10-CM

## 2022-10-17 DIAGNOSIS — R11.0 NAUSEA: ICD-10-CM

## 2022-10-17 PROBLEM — O21.0 HYPEREMESIS GRAVIDARUM: Status: RESOLVED | Noted: 2021-04-12 | Resolved: 2022-10-17

## 2022-10-17 PROCEDURE — 99214 OFFICE O/P EST MOD 30 MIN: CPT | Performed by: INTERNAL MEDICINE

## 2022-10-17 RX ORDER — SERTRALINE HYDROCHLORIDE 100 MG/1
50 TABLET, FILM COATED ORAL DAILY
COMMUNITY
Start: 2022-09-23

## 2022-10-17 NOTE — PROGRESS NOTES
[id number] [facility] - Outpatient Follow-up Note  Ketty Aguilera 40 y o  female MRN: [id number]  Encounter: [id number]    ASSESSMENT AND PLAN:      1  Ulcerative pancolitis with rectal bleeding ([address] )    Diagnosed 2013 and treated with mesalamine  Also had a flare-up in 2014 associated with C diff  off medications for years before a flare-up in July/August 2022  Treated with prednisone, colonoscopy September 2022 showed mild pancolitis, ileal biopsy showed active inflammation but no signs of chronicity    Symptoms improved but not resolved on mesalamine and following a prednisone taper  There may be some overlap IBS-D  She will start a probiotic daily and we will assess further at her upcoming EGD  We discussed a course of budesonide, or small-bowel imaging to assess for Crohn's    2  Nausea  3  Other fatigue  Intermittent nausea, resolved with Zofran as needed  Will assess further with EGD to assess for gastritis/peptic ulcer disease  4  Iron deficiency anemia  Due to blood loss from IBD  Resolved with iron infusions  Followup Appointment:  EGD October 31st, office December  ______________________________________________________________________    Chief Complaint   Patient presents with   • Follow up colonoscopy     HPI:  The patient returns for follow-up on ulcerative colitis  She was last seen at the time of a colonoscopy in September  Her rectal bleeding has resolved  She has 3 bowel movements daily which is her typical baseline  There still urgent and soft  She denies any accidents  She has persistent right lower quadrant abdominal discomfort that comes and goes  It is worse in the morning  Is not related to the bowel cycle  She continues to have fatigue that began during the flare-up in July  Headaches have resolved  She has intermittent nausea that resolved with Zofran but no vomiting  Her appetite is good      Historical Information   Past Medical History:   Diagnosis Date   • Anemia    • Anxiety     managed by PCP   • C  difficile colitis 2016    during pregnancy   • LGSIL on Pap smear of cervix     had leep 2016   • Ulcerative colitis ([facility] [address] )     Dr Pieter Blanco     Past Surgical History:   Procedure Laterality Date   • CERVICAL BIOPSY  W/ LOOP ELECTRODE EXCISION  2016   • COLONOSCOPY     • COLONOSCOPY  09/06/2022    mild pancolitis   • WISDOM TOOTH EXTRACTION  2000     Social History     Substance and Sexual Activity   Alcohol Use Never     Social History     Substance and Sexual Activity   Drug Use Never    Comment: No use     Social History     Tobacco Use   Smoking Status Never Smoker   Smokeless Tobacco Never Used     Family History   Problem Relation Age of Onset   • Heart disease Father    • Hypertension Father    • Thyroid disease Father    • No Known Problems Sister    • No Known Problems Sister    • No Known Problems Sister    • Breast cancer Sister 43   • No Known Problems Sister    • Colon polyps Brother    • No Known Problems Brother    • No Known Problems Daughter    • No Known Problems Son    • Colon cancer Neg Hx    • Ovarian cancer Neg Hx    • Uterine cancer Neg Hx    • Prostate cancer Neg Hx    • Pancreatic cancer Neg Hx          Current Outpatient Medications:   •  K Phos Mono-Sod Phos Di & Mono (Phospha 250 Neutral) 155-852-130 MG TABS  •  LORazepam (ATIVAN) 0 5 mg tablet  •  mesalamine (LIALDA) 1 2 g EC tablet  •  ondansetron (ZOFRAN-ODT) 4 mg disintegrating tablet  •  patient supplied medication  •  sertraline (ZOLOFT) 100 mg tablet  •  traZODone (DESYREL) 100 mg tablet  •  escitalopram (LEXAPRO) 10 mg tablet  Allergies   Allergen Reactions   • Amoxicillin Other (See Comments)     C-diff, per pt   • Ibuprofen Other (See Comments)     Ulcerative colitis, per pt     Reviewed medications and allergies and updated as indicated    PHYSICAL EXAM:    Blood pressure 110/60, height 5' 4 5" (1 638 m), weight 47 2 kg (104 lb), currently breastfeeding  Body mass index is 17 58 kg/m²  General Appearance: NAD, cooperative, alert  Eyes: Anicteric, PERRLA, EOMI  ENT:  Normocephalic, atraumatic, normal mucosa  Neck:  Supple, symmetrical, trachea midline  Resp:  Clear to auscultation bilaterally; no rales, rhonchi or wheezing; respirations unlabored   CV:  S1 S2, Regular rate and rhythm; no murmur, rub, or gallop  GI:  Soft, non-tender, non-distended; normal bowel sounds; no masses, no organomegaly   Rectal: Deferred  Musculoskeletal: No cyanosis, clubbing or edema  Normal ROM  Skin:  No jaundice, rashes, or lesions   Heme/Lymph: No palpable cervical lymphadenopathy  Psych: Normal affect, good eye contact  Neuro: No gross deficits, AAOx3    Lab Results:   No results found for: WBC, HGB, HCT, MCV, PLT  Lab Results   Component Value Date    K 4 7 10/12/2022     10/12/2022    CO2 23 10/12/2022    BUN 8 10/12/2022    CREATININE 0 79 10/12/2022    AST 24 10/12/2022    ALT 19 10/12/2022    EGFR 99 10/12/2022     No results found for: IRON, TIBC, FERRITIN  No results found for: LIPASE    Radiology Results:   No results found

## 2022-10-17 NOTE — H&P (VIEW-ONLY)
[id number] [facility] - Outpatient Follow-up Note  Collette Roman 40 y o  female MRN: [id number]  Encounter: [id number]    ASSESSMENT AND PLAN:      1  Ulcerative pancolitis with rectal bleeding ([address] )    Diagnosed 2013 and treated with mesalamine  Also had a flare-up in 2014 associated with C diff  off medications for years before a flare-up in July/August 2022  Treated with prednisone, colonoscopy September 2022 showed mild pancolitis, ileal biopsy showed active inflammation but no signs of chronicity    Symptoms improved but not resolved on mesalamine and following a prednisone taper  There may be some overlap IBS-D  She will start a probiotic daily and we will assess further at her upcoming EGD  We discussed a course of budesonide, or small-bowel imaging to assess for Crohn's    2  Nausea  3  Other fatigue  Intermittent nausea, resolved with Zofran as needed  Will assess further with EGD to assess for gastritis/peptic ulcer disease  4  Iron deficiency anemia  Due to blood loss from IBD  Resolved with iron infusions  Followup Appointment:  EGD October 31st, office December  ______________________________________________________________________    Chief Complaint   Patient presents with   • Follow up colonoscopy     HPI:  The patient returns for follow-up on ulcerative colitis  She was last seen at the time of a colonoscopy in September  Her rectal bleeding has resolved  She has 3 bowel movements daily which is her typical baseline  There still urgent and soft  She denies any accidents  She has persistent right lower quadrant abdominal discomfort that comes and goes  It is worse in the morning  Is not related to the bowel cycle  She continues to have fatigue that began during the flare-up in July  Headaches have resolved  She has intermittent nausea that resolved with Zofran but no vomiting  Her appetite is good      Historical Information   Past Medical History:   Diagnosis Date   • Anemia    • Anxiety     managed by PCP   • C  difficile colitis 2016    during pregnancy   • LGSIL on Pap smear of cervix     had leep 2016   • Ulcerative colitis ([facility] [address] )     Dr Neri Wilkins     Past Surgical History:   Procedure Laterality Date   • CERVICAL BIOPSY  W/ LOOP ELECTRODE EXCISION  2016   • COLONOSCOPY     • COLONOSCOPY  09/06/2022    mild pancolitis   • WISDOM TOOTH EXTRACTION  2000     Social History     Substance and Sexual Activity   Alcohol Use Never     Social History     Substance and Sexual Activity   Drug Use Never    Comment: No use     Social History     Tobacco Use   Smoking Status Never Smoker   Smokeless Tobacco Never Used     Family History   Problem Relation Age of Onset   • Heart disease Father    • Hypertension Father    • Thyroid disease Father    • No Known Problems Sister    • No Known Problems Sister    • No Known Problems Sister    • Breast cancer Sister 43   • No Known Problems Sister    • Colon polyps Brother    • No Known Problems Brother    • No Known Problems Daughter    • No Known Problems Son    • Colon cancer Neg Hx    • Ovarian cancer Neg Hx    • Uterine cancer Neg Hx    • Prostate cancer Neg Hx    • Pancreatic cancer Neg Hx          Current Outpatient Medications:   •  K Phos Mono-Sod Phos Di & Mono (Phospha 250 Neutral) 155-852-130 MG TABS  •  LORazepam (ATIVAN) 0 5 mg tablet  •  mesalamine (LIALDA) 1 2 g EC tablet  •  ondansetron (ZOFRAN-ODT) 4 mg disintegrating tablet  •  patient supplied medication  •  sertraline (ZOLOFT) 100 mg tablet  •  traZODone (DESYREL) 100 mg tablet  •  escitalopram (LEXAPRO) 10 mg tablet  Allergies   Allergen Reactions   • Amoxicillin Other (See Comments)     C-diff, per pt   • Ibuprofen Other (See Comments)     Ulcerative colitis, per pt     Reviewed medications and allergies and updated as indicated    PHYSICAL EXAM:    Blood pressure 110/60, height 5' 4 5" (1 638 m), weight 47 2 kg (104 lb), currently breastfeeding  Body mass index is 17 58 kg/m²  General Appearance: NAD, cooperative, alert  Eyes: Anicteric, PERRLA, EOMI  ENT:  Normocephalic, atraumatic, normal mucosa  Neck:  Supple, symmetrical, trachea midline  Resp:  Clear to auscultation bilaterally; no rales, rhonchi or wheezing; respirations unlabored   CV:  S1 S2, Regular rate and rhythm; no murmur, rub, or gallop  GI:  Soft, non-tender, non-distended; normal bowel sounds; no masses, no organomegaly   Rectal: Deferred  Musculoskeletal: No cyanosis, clubbing or edema  Normal ROM  Skin:  No jaundice, rashes, or lesions   Heme/Lymph: No palpable cervical lymphadenopathy  Psych: Normal affect, good eye contact  Neuro: No gross deficits, AAOx3    Lab Results:   No results found for: WBC, HGB, HCT, MCV, PLT  Lab Results   Component Value Date    K 4 7 10/12/2022     10/12/2022    CO2 23 10/12/2022    BUN 8 10/12/2022    CREATININE 0 79 10/12/2022    AST 24 10/12/2022    ALT 19 10/12/2022    EGFR 99 10/12/2022     No results found for: IRON, TIBC, FERRITIN  No results found for: LIPASE    Radiology Results:   No results found

## 2022-11-14 ENCOUNTER — ANESTHESIA EVENT (OUTPATIENT)
Dept: GASTROENTEROLOGY | Facility: AMBULATORY SURGERY CENTER | Age: 37
End: 2022-11-14

## 2022-11-14 ENCOUNTER — ANESTHESIA (OUTPATIENT)
Dept: GASTROENTEROLOGY | Facility: AMBULATORY SURGERY CENTER | Age: 37
End: 2022-11-14

## 2022-11-14 ENCOUNTER — HOSPITAL ENCOUNTER (OUTPATIENT)
Dept: GASTROENTEROLOGY | Facility: AMBULATORY SURGERY CENTER | Age: 37
Discharge: HOME | End: 2022-11-14

## 2022-11-14 VITALS
RESPIRATION RATE: 17 BRPM | HEART RATE: 84 BPM | HEIGHT: 65 IN | OXYGEN SATURATION: 100 % | TEMPERATURE: 98.1 F | WEIGHT: 104 LBS | BODY MASS INDEX: 17.33 KG/M2 | SYSTOLIC BLOOD PRESSURE: 110 MMHG | DIASTOLIC BLOOD PRESSURE: 70 MMHG

## 2022-11-14 DIAGNOSIS — R11.0 NAUSEA: ICD-10-CM

## 2022-11-14 DIAGNOSIS — K29.70 GASTRITIS DETERMINED BY ENDOSCOPY: Primary | ICD-10-CM

## 2022-11-14 PROBLEM — O09.529 ADVANCED MATERNAL AGE IN MULTIGRAVIDA: Status: RESOLVED | Noted: 2021-04-12 | Resolved: 2022-11-14

## 2022-11-14 LAB
EXT PREGNANCY TEST URINE: NEGATIVE
EXT. CONTROL: NORMAL

## 2022-11-14 RX ORDER — PROPOFOL 10 MG/ML
INJECTION, EMULSION INTRAVENOUS AS NEEDED
Status: DISCONTINUED | OUTPATIENT
Start: 2022-11-14 | End: 2022-11-14

## 2022-11-14 RX ORDER — SODIUM CHLORIDE, SODIUM LACTATE, POTASSIUM CHLORIDE, CALCIUM CHLORIDE 600; 310; 30; 20 MG/100ML; MG/100ML; MG/100ML; MG/100ML
50 INJECTION, SOLUTION INTRAVENOUS CONTINUOUS
Status: DISCONTINUED | OUTPATIENT
Start: 2022-11-14 | End: 2022-11-18 | Stop reason: HOSPADM

## 2022-11-14 RX ORDER — OMEPRAZOLE 40 MG/1
40 CAPSULE, DELAYED RELEASE ORAL DAILY
Qty: 30 CAPSULE | Refills: 2 | Status: SHIPPED | OUTPATIENT
Start: 2022-11-14

## 2022-11-14 RX ADMIN — PROPOFOL 100 MG: 10 INJECTION, EMULSION INTRAVENOUS at 08:42

## 2022-11-14 RX ADMIN — SODIUM CHLORIDE, SODIUM LACTATE, POTASSIUM CHLORIDE, CALCIUM CHLORIDE: 600; 310; 30; 20 INJECTION, SOLUTION INTRAVENOUS at 08:49

## 2022-11-14 RX ADMIN — SODIUM CHLORIDE, SODIUM LACTATE, POTASSIUM CHLORIDE, CALCIUM CHLORIDE 50 ML/HR: 600; 310; 30; 20 INJECTION, SOLUTION INTRAVENOUS at 08:21

## 2022-11-14 RX ADMIN — PROPOFOL 100 MG: 10 INJECTION, EMULSION INTRAVENOUS at 08:47

## 2022-11-14 NOTE — INTERVAL H&P NOTE
H&P reviewed  After examining the patient I find no changes in the patients condition since the H&P had been written      Vitals:    11/14/22 0811   BP: 103/61   Pulse: 93   Resp: 22   Temp: 98 1 °F (36 7 °C)   SpO2: 100%

## 2022-11-14 NOTE — ANESTHESIA POSTPROCEDURE EVALUATION
Post-Op Assessment Note    CV Status:  Stable  Pain Score: 0    Pain management: adequate     Mental Status:  Alert and awake   Hydration Status:  Euvolemic   PONV Controlled:  Controlled   Airway Patency:  Patent      Post Op Vitals Reviewed: Yes      Staff: CRNA         No complications documented      BP   108/75   Temp   98   Pulse  85   Resp   16   SpO2   100

## 2022-11-14 NOTE — ANESTHESIA PREPROCEDURE EVALUATION
Procedure:  EGD    Relevant Problems   ANESTHESIA (within normal limits)      CARDIO (within normal limits)      GYN   (-) Currently pregnant      NEURO/PSYCH   (+) Anxiety      PULMONARY  RSV 3 weeks ago, now recovered   (-) Sleep apnea   (-) Smoking      Digestive   (+) Ulcerative colitis ([facility] [address] )      Physical Exam    Airway    Mallampati score: II  TM Distance: >3 FB  Neck ROM: full     Dental   No notable dental hx     Cardiovascular      Pulmonary      Other Findings       Lab Results   Component Value Date    SODIUM 141 10/12/2022    K 4 7 10/12/2022    BUN 8 10/12/2022    CREATININE 0 79 10/12/2022    EGFR 99 10/12/2022     Anesthesia Plan  ASA Score- 2     Anesthesia Type- IV sedation with anesthesia with ASA Monitors  Additional Monitors:   Airway Plan:           Plan Factors-Exercise tolerance (METS): >4 METS  Chart reviewed  Existing labs reviewed  Patient summary reviewed  Patient is not a current smoker  Induction- intravenous  Postoperative Plan-     Informed Consent- Anesthetic plan and risks discussed with patient  I personally reviewed this patient with the CRNA  Discussed and agreed on the Anesthesia Plan with the DONAVON Amador

## 2022-11-25 ENCOUNTER — PATIENT MESSAGE (OUTPATIENT)
Dept: GASTROENTEROLOGY | Facility: CLINIC | Age: 37
End: 2022-11-25

## 2022-11-30 DIAGNOSIS — K29.70 GASTRITIS DETERMINED BY ENDOSCOPY: Primary | ICD-10-CM

## 2022-11-30 RX ORDER — FAMOTIDINE 40 MG/1
40 TABLET, FILM COATED ORAL DAILY
Qty: 30 TABLET | Refills: 2 | Status: SHIPPED | OUTPATIENT
Start: 2022-11-30

## 2022-12-07 DIAGNOSIS — K29.70 GASTRITIS DETERMINED BY ENDOSCOPY: ICD-10-CM

## 2022-12-07 RX ORDER — OMEPRAZOLE 40 MG/1
40 CAPSULE, DELAYED RELEASE ORAL DAILY
Qty: 90 CAPSULE | Refills: 1 | Status: SHIPPED | OUTPATIENT
Start: 2022-12-07 | End: 2022-12-16 | Stop reason: ALTCHOICE

## 2022-12-09 ENCOUNTER — PATIENT MESSAGE (OUTPATIENT)
Dept: GASTROENTEROLOGY | Facility: CLINIC | Age: 37
End: 2022-12-09

## 2022-12-09 DIAGNOSIS — K29.70 GASTRITIS DETERMINED BY ENDOSCOPY: ICD-10-CM

## 2022-12-16 RX ORDER — FAMOTIDINE 40 MG/1
40 TABLET, FILM COATED ORAL 2 TIMES DAILY
Qty: 60 TABLET | Refills: 2 | Status: SHIPPED | OUTPATIENT
Start: 2022-12-16

## 2022-12-29 ENCOUNTER — OFFICE VISIT (OUTPATIENT)
Dept: GASTROENTEROLOGY | Facility: CLINIC | Age: 37
End: 2022-12-29

## 2022-12-29 VITALS
SYSTOLIC BLOOD PRESSURE: 106 MMHG | WEIGHT: 102 LBS | BODY MASS INDEX: 17 KG/M2 | DIASTOLIC BLOOD PRESSURE: 76 MMHG | HEIGHT: 65 IN

## 2022-12-29 DIAGNOSIS — R11.0 NAUSEA: ICD-10-CM

## 2022-12-29 DIAGNOSIS — R19.7 DIARRHEA OF PRESUMED INFECTIOUS ORIGIN: Primary | ICD-10-CM

## 2022-12-29 DIAGNOSIS — K51.00 ULCERATIVE PANCOLITIS WITHOUT COMPLICATION (HCC): ICD-10-CM

## 2022-12-29 NOTE — PROGRESS NOTES
[id number] [facility] - Outpatient Follow-up Note  Ivette Dee 40 y o  female MRN: [id number]  Encounter: [id number]    ASSESSMENT AND PLAN:      1  Diarrhea of presumed infectious origin  2  Ulcerative pancolitis without complication ([facility] )  Children had similar symptoms before Christmas, hers are more pronounced with nausea and diarrhea up to 12 times a day  Had antibiotics a few months ago    Continue Zofran as needed for nausea  Obtain stool studies including C  difficile, if negative for infectious etiology okay to start Imodium  We will also check calprotectin to see if inflammatory bowel disease is contributing  2  Ulcerative pancolitis without complication ([facility] )  Diagnosed 2013 and treated with mesalamine  Also had a flare-up in 2014 associated with C diff  off medications for years before a flare-up in July/August 2022  Treated with prednisone, colonoscopy September 2022 showed mild pancolitis, ileal biopsy showed active inflammation but no signs of chronicity    Symptoms had been improving on mesalamine but recent flare with a suspected infectious colitis  There may be some overlap IBS-D  We discussed a course of budesonide, or small-bowel imaging to assess for Crohn's if symptoms persist  3  Nausea  Upper endoscopy in November showed mild H  pylori negative gastritis and peptic duodenitis  She has side effects with PPI  Continue H2 blocker  If symptoms persist, we will pursue an alternate etiology like biliary disease or ongoing IBD    Followup Appointment: 2 months  ______________________________________________________________________    Chief Complaint   Patient presents with   • diarrhea, nausea, hx of c diff     HPI: The patient presents for follow-up on ulcerative colitis and with a new complaint of increased nausea and diarrhea  Her children developed a GI bug about a week before [place]  On Christmas she developed nausea without vomiting    Zofran helps but does not alleviate symptoms completely  She has up to 12 loose stools daily but no bleeding  Stools are watery and occur throughout the day  She had antibiotics a few months ago  Prior to this acute onset of symptoms she had 3 loose stools daily with urgency but no cramping or bleeding  She had nausea but no vomiting  She did not tolerate a PPI for gastritis so she transitioned to an H2 blocker      Historical Information   Past Medical History:   Diagnosis Date   • Anemia    • Anxiety     managed by PCP   • C  difficile colitis 2016    during pregnancy   • LGSIL on Pap smear of cervix     had leep 2016   • Ulcerative colitis ([facility] [address] )     Dr Janet Strickland     Past Surgical History:   Procedure Laterality Date   • CERVICAL BIOPSY  W/ LOOP ELECTRODE EXCISION  2016   • COLONOSCOPY     • COLONOSCOPY  09/06/2022    mild pancolitis   • WISDOM TOOTH EXTRACTION  2000     Social History     Substance and Sexual Activity   Alcohol Use Never     Social History     Substance and Sexual Activity   Drug Use Never    Comment: No use     Social History     Tobacco Use   Smoking Status Never   Smokeless Tobacco Never     Family History   Problem Relation Age of Onset   • Heart disease Father    • Hypertension Father    • Thyroid disease Father    • No Known Problems Sister    • No Known Problems Sister    • No Known Problems Sister    • Breast cancer Sister 43   • No Known Problems Sister    • Colon polyps Brother    • No Known Problems Brother    • No Known Problems Daughter    • No Known Problems Son    • Colon cancer Neg Hx    • Ovarian cancer Neg Hx    • Uterine cancer Neg Hx    • Prostate cancer Neg Hx    • Pancreatic cancer Neg Hx          Current Outpatient Medications:   •  famotidine (PEPCID) 40 MG tablet  •  mesalamine (LIALDA) 1 2 g EC tablet  •  ondansetron (Zofran ODT) 8 mg disintegrating tablet  •  patient supplied medication  •  sertraline (ZOLOFT) 100 mg tablet  •  traZODone (DESYREL) 100 mg tablet  • escitalopram (LEXAPRO) 10 mg tablet  •  K Phos Mono-Sod Phos Di & Mono (Phospha 250 Neutral) 397-242-671 MG TABS  •  LORazepam (ATIVAN) 0 5 mg tablet  Allergies   Allergen Reactions   • Amoxicillin Other (See Comments)     C-diff, per pt   • Ibuprofen Other (See Comments)     Ulcerative colitis, per pt     Reviewed medications and allergies and updated as indicated    PHYSICAL EXAM:    Blood pressure 106/76, height 5' 4 5" (1 638 m), weight 46 3 kg (102 lb), not currently breastfeeding  Body mass index is 17 24 kg/m²  General Appearance: NAD, cooperative, alert  Eyes: Anicteric, PERRLA, EOMI  ENT:  Normocephalic, atraumatic, normal mucosa  Neck:  Supple, symmetrical, trachea midline  Resp:  Clear to auscultation bilaterally; no rales, rhonchi or wheezing; respirations unlabored   CV:  S1 S2, Regular rate and rhythm; no murmur, rub, or gallop  GI:  Soft, non-tender, non-distended; normal bowel sounds; no masses, no organomegaly   Rectal: Deferred  Musculoskeletal: No cyanosis, clubbing or edema  Normal ROM  Skin:  No jaundice, rashes, or lesions   Heme/Lymph: No palpable cervical lymphadenopathy  Psych: Normal affect, good eye contact  Neuro: No gross deficits, AAOx3    Lab Results:   No results found for: WBC, HGB, HCT, MCV, PLT  Lab Results   Component Value Date    K 4 7 10/12/2022     10/12/2022    CO2 23 10/12/2022    BUN 8 10/12/2022    CREATININE 0 79 10/12/2022    AST 24 10/12/2022    ALT 19 10/12/2022    EGFR 99 10/12/2022     No results found for: IRON, TIBC, FERRITIN  No results found for: LIPASE    Radiology Results:   No results found

## 2023-01-04 ENCOUNTER — TELEPHONE (OUTPATIENT)
Dept: GASTROENTEROLOGY | Facility: CLINIC | Age: 38
End: 2023-01-04

## 2023-01-04 LAB
ADV 40+41 DNA STL QL NAA+NON-PROBE: NOT DETECTED
ASTRO TYP 1-8 RNA STL QL NAA+NON-PROBE: NOT DETECTED
C CAYETANENSIS DNA STL QL NAA+NON-PROBE: NOT DETECTED
C COLI+JEJ+UPSA DNA STL QL NAA+NON-PROBE: NOT DETECTED
C DIF TOX TCDA+TCDB STL QL NAA+NON-PROBE: NOT DETECTED
C DIFF TOX GENS STL QL NAA+PROBE: NEGATIVE
CALPROTECTIN STL-MCNT: <16 UG/G (ref 0–120)
CRYPTOSP DNA STL QL NAA+NON-PROBE: NOT DETECTED
E COLI O157 DNA STL QL NAA+NON-PROBE: ABNORMAL
E HISTOLYT DNA STL QL NAA+NON-PROBE: NOT DETECTED
EAEC PAA PLAS AGGR+AATA ST NAA+NON-PRB: NOT DETECTED
EC STX1+STX2 GENES STL QL NAA+NON-PROBE: NOT DETECTED
EPEC EAE GENE STL QL NAA+NON-PROBE: NOT DETECTED
ETEC LTA+ST1A+ST1B TOX ST NAA+NON-PROBE: NOT DETECTED
G LAMBLIA DNA STL QL NAA+NON-PROBE: NOT DETECTED
NOROVIRUS GI+II RNA STL QL NAA+NON-PROBE: NOT DETECTED
P SHIGELLOIDES DNA STL QL NAA+NON-PROBE: NOT DETECTED
RVA RNA STL QL NAA+NON-PROBE: NOT DETECTED
S ENT+BONG DNA STL QL NAA+NON-PROBE: NOT DETECTED
SAPO I+II+IV+V RNA STL QL NAA+NON-PROBE: DETECTED
SHIGELLA SP+EIEC IPAH ST NAA+NON-PROBE: NOT DETECTED
V CHOL+PARA+VUL DNA STL QL NAA+NON-PROBE: NOT DETECTED
V CHOLERAE DNA STL QL NAA+NON-PROBE: NOT DETECTED
Y ENTEROCOL DNA STL QL NAA+NON-PROBE: NOT DETECTED

## 2023-01-04 NOTE — TELEPHONE ENCOUNTER
Amber Bee DO  P [facility]astro[facility]ology [facility]  Please arrange a small bowel capsule for IBD  History of ulcerative colitis, in hematology for iron deficiency anemia, plan capsule to rule out small bowel Crohn's     Currently dealing with a viral gastroenteritis, I would like to wait 2 to 3 weeks before scheduling this   Please assigned to me for reading when she returns capsule

## 2023-01-12 NOTE — TELEPHONE ENCOUNTER
Spoke with [facility] this morning  Reinaldo Casey ref # [id number]  Capsule endoscopy [id number] does not require PA if participating provider done in OP setting  [facility] message sent to patient

## 2023-02-03 ENCOUNTER — TELEPHONE (OUTPATIENT)
Dept: GASTROENTEROLOGY | Facility: CLINIC | Age: 38
End: 2023-02-03

## 2023-02-03 NOTE — TELEPHONE ENCOUNTER
----- Message from Joey Silva sent at 2/1/2023 11:33 AM EST -----  Regarding: FW: Capsule test  Contact: [phone]    ----- Message -----  From: Gabriela Monson  Sent: 2/1/2023  10:23 AM EST  To: , #  Subject: Capsule test                                     Hi Dr Mel Koroma you are doing well  I wanted to request pushing off the capsule test til Spring or early summer if you are okay with that  I had the stomach bug (again) last week and am still dealing with lingering symptoms  Ever since the start of my flare in July, colonoscopy, gastritis, stomach bug twice I want to see if I can give my body some time to achieve remission before taking a heavy dose of laxatives       Please let me know your thoughts, and I will do whatever you feel is best     Thank you

## 2023-03-07 NOTE — ASSESSMENT & PLAN NOTE
- Patient's sister recently diagnosed with breast cancer at age 43  Unsure if she was tested for hereditary cancer syndromes  - Recommend initiation of screening mammography now  Patient currently breastfeeding  As such, will obtain baseline exam 6 months after cessation of breastfeeding  Order provided today  - Recommend consultation with Oncology Genetics  Oral Minoxidil Counseling- I discussed with the patient the risks of oral minoxidil including but not limited to shortness of breath, swelling of the feet or ankles, dizziness, lightheadedness, unwanted hair growth and allergic reaction.  The patient verbalized understanding of the proper use and possible adverse effects of oral minoxidil.  All of the patient's questions and concerns were addressed.

## 2023-03-08 ENCOUNTER — OFFICE VISIT (OUTPATIENT)
Dept: GASTROENTEROLOGY | Facility: CLINIC | Age: 38
End: 2023-03-08

## 2023-03-08 VITALS
BODY MASS INDEX: 17.49 KG/M2 | HEIGHT: 65 IN | SYSTOLIC BLOOD PRESSURE: 112 MMHG | WEIGHT: 105 LBS | DIASTOLIC BLOOD PRESSURE: 72 MMHG

## 2023-03-08 DIAGNOSIS — K51.00 ULCERATIVE PANCOLITIS WITHOUT COMPLICATION (HCC): Primary | ICD-10-CM

## 2023-03-08 NOTE — PROGRESS NOTES
[id number] - Outpatient Follow-up Note  Agustin Chen 40 y o  female MRN: [id number]  Encounter: [id number]    ASSESSMENT AND PLAN:      1  Ulcerative pancolitis without complication ([address] )  Diagnosed 2013 and treated with mesalamine  Na López had a flare-up in 2014 associated with C diff  off medications for years before a flare-up in July/August 2022   Treated with prednisone, colonoscopy September 2022 showed mild pancolitis, ileal biopsy showed active inflammation but no signs of chronicity    Gradually improving with mesalamine  She has quarterly hematology labs planned next week, I added inflammatory markers  She will likely need a few more months of mesalamine 4 8 g daily, eventually plan to taper to 2 4, likely with her June follow-up labs  - Sedimentation rate, automated  - C-reactive protein  - Comprehensive metabolic panel  2  Nausea-EGD November 2022 showed mild H  pylori negative gastritis and peptic duodenitis  Had side effects with PPI  Symptoms resolved with H2 blocker, taper as tolerated  3  Infectious diarrhea-family was sick in December, stool studies then showed sapovirus, symptoms resolved with supportive care      Followup Appointment: Labs now, office 1 year  ______________________________________________________________________    Chief Complaint   Patient presents with   • Follow up-ulcerative colitis     HPI: The patient returns for follow-up on ulcerative colitis  She is feeling much better  Her prior baseline was about 4-5 stools a day, up to 10 during a flareup  She now has 2 soft but formed nonbloody stools daily  Stools are often urgent but she has no accidents  There is no rectal bleeding  She has dull abdominal aching that moves from the right to the left  There are no specific food triggers  She does not require medication for this  She occasionally has epigastric discomfort but is not related to particular food intake    She is eating okay and back to exercising including running      Historical Information   Past Medical History:   Diagnosis Date   • Anemia    • Anxiety     managed by PCP   • C  difficile colitis 2016    during pregnancy   • Irritable bowel syndrome    • Lactose intolerance 2021   • LGSIL on Pap smear of cervix     had leep 2016   • Ulcerative colitis ([facility] [address] )     Dr Steph Sterling     Past Surgical History:   Procedure Laterality Date   • CERVICAL BIOPSY  W/ LOOP ELECTRODE EXCISION  2016   • COLONOSCOPY     • COLONOSCOPY  09/06/2022    mild pancolitis   • WISDOM TOOTH EXTRACTION  2000     Social History     Substance and Sexual Activity   Alcohol Use Never     Social History     Substance and Sexual Activity   Drug Use Never    Comment: No use     Social History     Tobacco Use   Smoking Status Never   Smokeless Tobacco Never     Family History   Problem Relation Age of Onset   • Heart disease Father    • Hypertension Father    • Thyroid disease Father    • Anemia Sister    • Depression Sister    • Inflammatory bowel disease Sister    • Breast cancer Sister    • Irritable bowel syndrome Sister    • Ulcerative colitis Sister    • No Known Problems Sister    • Breast cancer Sister 43   • No Known Problems Sister    • Colon polyps Brother    • Depression Brother    • No Known Problems Brother    • No Known Problems Daughter    • No Known Problems Son    • Colon cancer Neg Hx    • Ovarian cancer Neg Hx    • Uterine cancer Neg Hx    • Prostate cancer Neg Hx    • Pancreatic cancer Neg Hx          Current Outpatient Medications:   •  famotidine (PEPCID) 40 MG tablet  •  LORazepam (ATIVAN) 0 5 mg tablet  •  mesalamine (LIALDA) 1 2 g EC tablet  •  ondansetron (Zofran ODT) 8 mg disintegrating tablet  •  patient supplied medication  •  sertraline (ZOLOFT) 100 mg tablet  •  traZODone (DESYREL) 100 mg tablet  Allergies   Allergen Reactions   • Amoxicillin Other (See Comments)     C-diff, per pt   • Ibuprofen Other (See Comments) Ulcerative colitis, per pt     Reviewed medications and allergies and updated as indicated    PHYSICAL EXAM:    Blood pressure 112/72, height 5' 4 5" (1 638 m), weight 47 6 kg (105 lb), not currently breastfeeding  Body mass index is 17 74 kg/m²  General Appearance: NAD, cooperative, alert  Eyes: Anicteric, PERRLA, EOMI  ENT:  Normocephalic, atraumatic, normal mucosa  Neck:  Supple, symmetrical, trachea midline  Resp:  Clear to auscultation bilaterally; no rales, rhonchi or wheezing; respirations unlabored   CV:  S1 S2, Regular rate and rhythm; no murmur, rub, or gallop  GI:  Soft, non-tender, non-distended; normal bowel sounds; no masses, no organomegaly   Rectal: Deferred  Musculoskeletal: No cyanosis, clubbing or edema  Normal ROM  Skin:  No jaundice, rashes, or lesions   Heme/Lymph: No palpable cervical lymphadenopathy  Psych: Normal affect, good eye contact  Neuro: No gross deficits, AAOx3    Lab Results:   No results found for: WBC, HGB, HCT, MCV, PLT  Lab Results   Component Value Date    K 4 7 10/12/2022     10/12/2022    CO2 23 10/12/2022    BUN 8 10/12/2022    CREATININE 0 79 10/12/2022    AST 24 10/12/2022    ALT 19 10/12/2022    EGFR 99 10/12/2022     No results found for: IRON, TIBC, FERRITIN  No results found for: LIPASE    Radiology Results:   No results found

## 2023-03-10 ENCOUNTER — TELEPHONE (OUTPATIENT)
Dept: GASTROENTEROLOGY | Facility: CLINIC | Age: 38
End: 2023-03-10

## 2023-03-10 NOTE — TELEPHONE ENCOUNTER
Patient contacted Dr Faizan Lopez via the portal and he would like us to schedule her for an appt in 3 months  I called and left a message for her to call us back   Andreas Fry

## 2023-03-14 LAB
ALBUMIN SERPL-MCNC: 4.6 G/DL (ref 3.8–4.8)
ALBUMIN/GLOB SERPL: 2.2 {RATIO} (ref 1.2–2.2)
ALP SERPL-CCNC: 43 IU/L (ref 44–121)
ALT SERPL-CCNC: 24 IU/L (ref 0–32)
AST SERPL-CCNC: 27 IU/L (ref 0–40)
BILIRUB SERPL-MCNC: 0.5 MG/DL (ref 0–1.2)
BUN SERPL-MCNC: 11 MG/DL (ref 6–20)
BUN/CREAT SERPL: 13 (ref 9–23)
CALCIUM SERPL-MCNC: 9.6 MG/DL (ref 8.7–10.2)
CHLORIDE SERPL-SCNC: 102 MMOL/L (ref 96–106)
CO2 SERPL-SCNC: 24 MMOL/L (ref 20–29)
CREAT SERPL-MCNC: 0.85 MG/DL (ref 0.57–1)
CRP SERPL-MCNC: <1 MG/L (ref 0–10)
EGFR: 91 ML/MIN/1.73
ERYTHROCYTE [SEDIMENTATION RATE] IN BLOOD BY WESTERGREN METHOD: 2 MM/HR (ref 0–32)
GLOBULIN SER-MCNC: 2.1 G/DL (ref 1.5–4.5)
GLUCOSE SERPL-MCNC: 92 MG/DL (ref 70–99)
POTASSIUM SERPL-SCNC: 4.5 MMOL/L (ref 3.5–5.2)
PROT SERPL-MCNC: 6.7 G/DL (ref 6–8.5)
SODIUM SERPL-SCNC: 139 MMOL/L (ref 134–144)

## 2023-03-15 ENCOUNTER — PATIENT MESSAGE (OUTPATIENT)
Dept: GASTROENTEROLOGY | Facility: CLINIC | Age: 38
End: 2023-03-15

## 2023-03-15 DIAGNOSIS — K51.00 ULCERATIVE PANCOLITIS WITHOUT COMPLICATION (HCC): Primary | ICD-10-CM

## 2023-03-24 DIAGNOSIS — R11.0 NAUSEA: ICD-10-CM

## 2023-03-24 RX ORDER — ONDANSETRON 8 MG/1
TABLET, ORALLY DISINTEGRATING ORAL
Qty: 60 TABLET | Refills: 2 | Status: SHIPPED | OUTPATIENT
Start: 2023-03-24

## 2023-04-05 ENCOUNTER — TELEPHONE (OUTPATIENT)
Dept: GASTROENTEROLOGY | Facility: CLINIC | Age: 38
End: 2023-04-05

## 2023-04-05 NOTE — TELEPHONE ENCOUNTER
Left message on phone number provided on patient's EMR  Tried to answer some of the questions on patient's list of concerns or symptoms  Will attempt to call the patient again on 4/6

## 2023-04-05 NOTE — TELEPHONE ENCOUNTER
----- Message from [facility] sent at 4/5/2023  9:25 AM EDT -----  Regarding: FW: Antibiotics   Contact: [phone]    ----- Message -----  From: Siobhan Segura  Sent: 4/5/2023   8:59 AM EDT  To: , #  Subject: Antibiotics                                      Yes please

## 2023-04-07 ENCOUNTER — TELEPHONE (OUTPATIENT)
Dept: GASTROENTEROLOGY | Facility: CLINIC | Age: 38
End: 2023-04-07

## 2023-04-07 NOTE — TELEPHONE ENCOUNTER
Spoke to the patient in regards to her symptoms of diarrhea  Patient had stated that she was placed on a Z-Champ to treat an upper respiratory infection  After a couple days of taking antibiotics she developed worsening diarrhea  She does state that as of today she feels that her bowel movements are back to her baseline  Encouraged patient to contact the office with any other symptoms or concerns

## 2023-04-28 ENCOUNTER — CLINICAL SUPPORT (OUTPATIENT)
Dept: GASTROENTEROLOGY | Facility: CLINIC | Age: 38
End: 2023-04-28

## 2023-04-28 DIAGNOSIS — K51.00 ULCERATIVE (CHRONIC) PANCOLITIS WITHOUT COMPLICATIONS (HCC): ICD-10-CM

## 2023-04-28 LAB
ALBUMIN SERPL-MCNC: 4.5 G/DL (ref 3.8–4.8)
ALP SERPL-CCNC: 44 IU/L (ref 44–121)
ALT SERPL-CCNC: 24 IU/L (ref 0–32)
AST SERPL-CCNC: 31 IU/L (ref 0–40)
BILIRUB DIRECT SERPL-MCNC: 0.12 MG/DL (ref 0–0.4)
BILIRUB SERPL-MCNC: 0.3 MG/DL (ref 0–1.2)
CRP SERPL-MCNC: <1 MG/L (ref 0–10)
ERYTHROCYTE [SEDIMENTATION RATE] IN BLOOD BY WESTERGREN METHOD: 2 MM/HR (ref 0–32)
PROT SERPL-MCNC: 6.5 G/DL (ref 6–8.5)
ZINC SERPL-MCNC: 70 UG/DL (ref 44–115)

## 2023-04-28 NOTE — Clinical Note
I read the capsule  Please obtain most recent office note and labs from [facility]  Thanks

## 2023-05-05 PROBLEM — O99.340 DEPRESSION AFFECTING PREGNANCY: Status: RESOLVED | Noted: 2021-04-12 | Resolved: 2023-05-05

## 2023-05-05 PROBLEM — U07.1 COVID-19 AFFECTING PREGNANCY, ANTEPARTUM: Status: RESOLVED | Noted: 2021-04-12 | Resolved: 2023-05-05

## 2023-05-05 PROBLEM — F32.A DEPRESSION AFFECTING PREGNANCY: Status: RESOLVED | Noted: 2021-04-12 | Resolved: 2023-05-05

## 2023-05-05 PROBLEM — O98.519 COVID-19 AFFECTING PREGNANCY, ANTEPARTUM: Status: RESOLVED | Noted: 2021-04-12 | Resolved: 2023-05-05

## 2023-05-05 NOTE — PROGRESS NOTES
Small bowel capsule endoscopy  Indication-history of ulcerative colitis, iron deficiency anemia, assess for small bowel Crohn's    Procedure-normal transit through esophagus, stomach, and small bowel  Capsule reaches the cecum at 6 hours 32 minutes  Findings-no mucosal abnormalities to suggest small bowel Crohn's disease    Continue mesalamine   we will obtain most recent labs from [facility]  Office follow-up as scheduled to discuss mesalamine taper  Findings discussed with patient via [facility] patient portal

## 2023-05-11 ENCOUNTER — TELEPHONE (OUTPATIENT)
Dept: GASTROENTEROLOGY | Facility: CLINIC | Age: 38
End: 2023-05-11

## 2023-05-11 NOTE — TELEPHONE ENCOUNTER
----- Message from Leta Keen sent at 5/10/2023  8:44 AM EDT -----  Regarding: FW: capsule  Contact: [phone]    ----- Message -----  From: Gee Roach  Sent: 5/9/2023   6:32 PM EDT  To: , #  Subject: capsule                                          Hi Dr Johnathon Bruner welcome! Thanks for the explanation  I agree it would be better to discuss in the office setting, also a physical exam so I can better describe and show you what and where I’m feeling symptoms  If the office calls and it works to come in sooner with childcare that would be great, but can you please keep the June one until we know for sure a sooner one works? The early time in June works great bc my  can go in a little later to work while I have my appointment  Thanks!

## 2023-05-11 NOTE — TELEPHONE ENCOUNTER
Patient contacted me via the portal with ongoing symptoms  Please look for a sooner office visit with me, 30 minutes      Please also keep the June visit, we will discuss at her office visit whether it can be canceled

## 2023-05-16 NOTE — TELEPHONE ENCOUNTER
Dr Kayode Ortega wanted to get her in sooner because of ongoing symptoms so called and left a voice mail to call back and schedule with Dr Kayode Ortega on 05/18/23 at either 2pm, 2:30pm or 3pm

## 2023-05-17 NOTE — TELEPHONE ENCOUNTER
Spoke to the patient and she wasn't able to do appts on May 18th or May 19th so told her we would call back next week if any more appts become available for Dr Laith Lugo

## 2023-05-24 ENCOUNTER — PATIENT MESSAGE (OUTPATIENT)
Dept: GASTROENTEROLOGY | Facility: CLINIC | Age: 38
End: 2023-05-24

## 2023-05-24 DIAGNOSIS — K51.00 ULCERATIVE PANCOLITIS WITHOUT COMPLICATION (HCC): Primary | ICD-10-CM

## 2023-06-01 ENCOUNTER — PATIENT MESSAGE (OUTPATIENT)
Dept: OBGYN CLINIC | Facility: CLINIC | Age: 38
End: 2023-06-01

## 2023-06-01 ENCOUNTER — TELEPHONE (OUTPATIENT)
Dept: OBGYN CLINIC | Facility: CLINIC | Age: 38
End: 2023-06-01

## 2023-06-01 NOTE — TELEPHONE ENCOUNTER
"Call placed to patient to further address urinary concerns, Dr Josie Ramos is out of the office today  Pt reports she was placed on a course of antibiotic therapy  Bactrim and Macrobid for UTI  Pt report she continues to have urinary frequency, urgency, burning upon urination, mild abdominal and low back pain  Reports a Low grade temperature of 99 2  Pt has a call into her PCP and is awaiting call back  Pt has history of ulcerative colitis, follows GI  Reports when she takes antibiotic therapy, it tends to cause her ulcerative colitis to flare and abdominal discomfort is \"likely\" from her colitis  Pt contacted her GI, who believes the benefits of taking antibiotics outweigh the risk  Pt denies abnormal vaginal discharge, irritation, itching, odor or recent change in partners  Inquired if there is any chance of pregnancy? Pt informed she just finished her period, UPT done in PCP office and a HPT yesterday were negative  Based upon persistent symptoms, recommended to follow-up with PCP today for further evaluation, additional treatment, and testing  If patient should develop any gyn concern for abnormal vaginal discharge, irritation, itching, or odor, please contact the office  Pt appreciative of call  Pt verbalized she will contact PCP today  Forwarded to Dr Josie Ramos     "

## 2023-06-01 NOTE — TELEPHONE ENCOUNTER
----- Message from [place] sent at 6/1/2023  9:59 AM EDT -----  Regarding: Uti  Contact: [phone]  Hi Dr Seamus Molina,    I recently was diagnosed with a UTI  They prescribed me bactrim, later to find out that the culture showed that the bacteria was resistant to  That antibiotic  Then I was on Macrobid which I just finished yesterday but am still  Having symptoms such as frequency, urgency, some burning , abdominal pain, some back pain  I took an at home test for UTI and it showed the presence of nitrites and Lueokytes  (Not sure how accurate they are)  Wondering if you had any advice or direction for me  Thank you!      Nathen Austin

## 2023-06-02 NOTE — TELEPHONE ENCOUNTER
Agree with recommendations  I responded to patient via [address]       Pawan Phipps MD  6/2/2023 2:41 PM

## 2023-06-05 ENCOUNTER — PATIENT MESSAGE (OUTPATIENT)
Dept: GASTROENTEROLOGY | Facility: CLINIC | Age: 38
End: 2023-06-05

## 2023-06-05 DIAGNOSIS — K29.70 GASTRITIS DETERMINED BY ENDOSCOPY: Primary | ICD-10-CM

## 2023-06-05 NOTE — TELEPHONE ENCOUNTER
Bruce Miller 6/5/2023 9:00 AM EDT      ----- Message -----  From: Nahomi Peterster  Sent: 6/4/2023 10:17 AM EDT  To: *  Subject: Priyanka Donohue Heads,    I ended up calling my primary care doc on call bc my symptoms are not improving and she said if my symptoms get worse to go to the ER or if my temp gets higher(it was 99 3, but my baseline is 97 3) she said likely they will order a cat scan tomorrow(Monday) if my symptoms don’t improve on day 3 of this third antibiotic which is today  She was also asking about my GI symptoms and I was going to call the doc on call for [place], but I think If I do end up in the ER I can just discuss there, I’d really prefer to talk to you since you know me and my case best so that’s why I’m sending you an update    I am trying to avoid the ER  But I am going about 8 times a day, maybe more, and I noticed my stool is super dark maybe even black but it’s hard to tell  I will keep you updated

## 2023-06-12 ENCOUNTER — OFFICE VISIT (OUTPATIENT)
Dept: GASTROENTEROLOGY | Facility: CLINIC | Age: 38
End: 2023-06-12
Payer: COMMERCIAL

## 2023-06-12 VITALS
SYSTOLIC BLOOD PRESSURE: 104 MMHG | HEIGHT: 65 IN | BODY MASS INDEX: 16.83 KG/M2 | WEIGHT: 101 LBS | DIASTOLIC BLOOD PRESSURE: 64 MMHG

## 2023-06-12 DIAGNOSIS — K51.00 ULCERATIVE PANCOLITIS WITHOUT COMPLICATION (HCC): Primary | ICD-10-CM

## 2023-06-12 DIAGNOSIS — K58.0 IRRITABLE BOWEL SYNDROME WITH DIARRHEA: ICD-10-CM

## 2023-06-12 DIAGNOSIS — N30.00 ACUTE CYSTITIS WITHOUT HEMATURIA: ICD-10-CM

## 2023-06-12 PROCEDURE — 99214 OFFICE O/P EST MOD 30 MIN: CPT | Performed by: INTERNAL MEDICINE

## 2023-06-12 NOTE — PROGRESS NOTES
[address] [facility] - Outpatient Follow-up Note  Vandana Marcus 41 y o  female MRN: [id number]  Encounter: [id number]    ASSESSMENT AND PLAN:      1  Ulcerative pancolitis without complication ([address] )  2  Irritable bowel syndrome with diarrhea  Diagnosed 2013 and treated with mesalamine  Briana Navarro had a flare-up in 2014 associated with C diff  off medications for years before a flare-up in July/August 2022   Treated with prednisone, colonoscopy September 2022 showed mild pancolitis, ileal biopsy showed active inflammation but no signs of chronicity  Small bowel capsule negative for small bowel inflammation    Symptoms had been stable with about 4 BMs daily on mesalamine, but increased to 8-10 while on antibiotics for UTI  Diarrhea improving  Stool studies and inflammatory markers obtained last week at [facility] but results not yet available  I will contact patient with results  if negative, suspect IBS-D and antibiotic associated diarrhea  If inflammatory markers are elevated we will discuss escalating medications    3  Acute cystitis without hematuria  Required multiple courses of antibiotics  Saw urology and prescribed Ditropan for overactive bladder with some improvement  Urology follow-up planned in a few weeks    4  Gastritis/duodenitis-generally stable on famotidine  We discussed adding antacids as needed      Followup Appointment: 6 to 8 weeks  ______________________________________________________________________    Chief Complaint   Patient presents with   • follow up     HPI: The patient presents for follow-up on ulcerative colitis  She was last seen in the office March 8  Since then she has been dealing with urinary tract infections and required multiple courses of antibiotics  While on antibiotics stool frequency and urgency increased  At baseline she has about 4 loose urgent stools daily spread throughout the day with no nocturnal symptoms    On antibiotic she was up to about 10 stools per day without rectal bleeding  She has abdominal cramping that she ascribes to the UTI  Timing does not correlate with stools  Appetite is good and weight is stable      Historical Information   Past Medical History:   Diagnosis Date   • Anemia    • Anxiety     managed by PCP   • C  difficile colitis 2016    during pregnancy   • Irritable bowel syndrome    • Lactose intolerance 2021   • LGSIL on Pap smear of cervix     had leep 2016   • Ulcerative colitis ([facility] [address] )     Dr Jostin Salazar     Past Surgical History:   Procedure Laterality Date   • CERVICAL BIOPSY  W/ LOOP ELECTRODE EXCISION  2016   • COLONOSCOPY     • COLONOSCOPY  09/06/2022    mild pancolitis   • WISDOM TOOTH EXTRACTION  2000     Social History     Substance and Sexual Activity   Alcohol Use Never     Social History     Substance and Sexual Activity   Drug Use Never    Comment: No use     Social History     Tobacco Use   Smoking Status Never   Smokeless Tobacco Never     Family History   Problem Relation Age of Onset   • Heart disease Father    • Hypertension Father    • Thyroid disease Father    • Anemia Sister    • Depression Sister    • Inflammatory bowel disease Sister    • Breast cancer Sister    • Irritable bowel syndrome Sister    • Ulcerative colitis Sister    • No Known Problems Sister    • Breast cancer Sister 43   • No Known Problems Sister    • Colon polyps Brother    • Depression Brother    • No Known Problems Brother    • No Known Problems Daughter    • No Known Problems Son    • Colon cancer Neg Hx    • Ovarian cancer Neg Hx    • Uterine cancer Neg Hx    • Prostate cancer Neg Hx    • Pancreatic cancer Neg Hx          Current Outpatient Medications:   •  famotidine (PEPCID) 40 MG tablet  •  LORazepam (ATIVAN) 0 5 mg tablet  •  mesalamine (LIALDA) 1 2 g EC tablet  •  ondansetron (ZOFRAN-ODT) 8 mg disintegrating tablet  •  patient supplied medication  •  sertraline (ZOLOFT) 100 mg tablet  •  traZODone (DESYREL) 100 mg "tablet  Allergies   Allergen Reactions   • Amoxicillin Other (See Comments)     C-diff, per pt   • Ibuprofen Other (See Comments)     Ulcerative colitis, per pt     Reviewed medications and allergies and updated as indicated    PHYSICAL EXAM:    Blood pressure 104/64, height 5' 4 5\" (1 638 m), weight 45 8 kg (101 lb), not currently breastfeeding  Body mass index is 17 07 kg/m²  General Appearance: NAD, cooperative, alert  Eyes: Anicteric, PERRLA, EOMI  ENT:  Normocephalic, atraumatic, normal mucosa  Neck:  Supple, symmetrical, trachea midline  Resp:  Clear to auscultation bilaterally; no rales, rhonchi or wheezing; respirations unlabored   CV:  S1 S2, Regular rate and rhythm; no murmur, rub, or gallop  GI:  Soft, non-tender, non-distended; normal bowel sounds; no masses, no organomegaly   Rectal: Deferred  Musculoskeletal: No cyanosis, clubbing or edema  Normal ROM  Skin:  No jaundice, rashes, or lesions   Heme/Lymph: No palpable cervical lymphadenopathy  Psych: Normal affect, good eye contact  Neuro: No gross deficits, AAOx3    Lab Results:   No results found for: \"HCT\", \"HGB\", \"MCV\", \"PLT\", \"WBC\"  Lab Results   Component Value Date    ALT 24 04/26/2023    AST 31 04/26/2023    BUN 11 03/13/2023     03/13/2023    CO2 24 03/13/2023    CREATININE 0 85 03/13/2023    EGFR 91 03/13/2023    K 4 5 03/13/2023     No results found for: \"FERRITIN\", \"IRON\", \"TIBC\"  No results found for: \"LIPASE\"    Radiology Results:   ULTRASOUND EXTERNAL    Result Date: 6/7/2023  Narrative: This result has an attachment that is not available  Ordered by an unspecified provider  Answers for HPI/ROS submitted by the patient on 6/5/2023  Did the infection require hospitalization?: No  Did the infection require antibiotics?: Yes  Is there any possibility that you may be pregnant?: No  In the past three months, have you used tobacco in any form?: No  During the last year, how many days have you missed work or school because of your " inflammatory bowel disease?: 0  During the last year, how many days have you been hospitalized because of your inflammatory bowel disease?: 0  During the last year, how many days have you visited a hospital emergency department because of your inflammatory bowel disease?: 0  During the last month, have you taken narcotic pain medications (such as Percocet, oxycodone, Oxycontin, morphine, Vicodin, Dilaudid, MS Contin) for your inflammatory bowel disease?: No  Have you awoken at night because you needed to move your bowels during the last month? : Yes  Have you had leakage of stool while sleeping during the last month?: No  Have you had leakage of stool while you were awake during the last month?: No  In the last 6 months, have you unintentionally lost weight?: No  Fever: Yes  During the last 3 days, have you had eye irritation?: No  Mouth sores: Yes  During the last 3 days, have you had a sore throat?: No  During the last 3 days, have you had chest pain?: No  Shortness of breath: Yes  During the last 3 days, have you had numbness or tingling in your hands or feet?: No  During the last 3 days, have you had a skin rash?: No  Pain or swelling in your joints: Yes  Bruising or bleeding: Yes  Felt depressed or blue: No  When you are not experiencing symptoms of your inflammatory bowel disease, how many bowel movements do you typically have each day?: 3  What is the average (typical) number of bowel movements that you had in a single day during the last week?: 8  Over the last 3 days, have you had any bowel movements where you passed blood without stool?: No  Since your last visit, have you received any vaccinations?: No  Since the last visit, have you had an infection?: Yes

## 2023-06-15 LAB
ADV 40+41 DNA STL QL NAA+NON-PROBE: NOT DETECTED
ALBUMIN SERPL-MCNC: 4.6 G/DL (ref 3.8–4.8)
ALBUMIN/GLOB SERPL: 2.4 {RATIO} (ref 1.2–2.2)
ALP SERPL-CCNC: 41 IU/L (ref 44–121)
ALT SERPL-CCNC: 21 IU/L (ref 0–32)
AST SERPL-CCNC: 26 IU/L (ref 0–40)
ASTRO TYP 1-8 RNA STL QL NAA+NON-PROBE: NOT DETECTED
BILIRUB SERPL-MCNC: 0.4 MG/DL (ref 0–1.2)
BUN SERPL-MCNC: 11 MG/DL (ref 6–20)
BUN/CREAT SERPL: 13 (ref 9–23)
C CAYETANENSIS DNA STL QL NAA+NON-PROBE: NOT DETECTED
C COLI+JEJ+UPSA DNA STL QL NAA+NON-PROBE: NOT DETECTED
C DIF TOX TCDA+TCDB STL QL NAA+NON-PROBE: NOT DETECTED
C DIFF TOX GENS STL QL NAA+PROBE: NEGATIVE
CALCIUM SERPL-MCNC: 9.7 MG/DL (ref 8.7–10.2)
CALPROTECTIN STL-MCNT: 6 UG/G (ref 0–120)
CHLORIDE SERPL-SCNC: 104 MMOL/L (ref 96–106)
CO2 SERPL-SCNC: 23 MMOL/L (ref 20–29)
CREAT SERPL-MCNC: 0.87 MG/DL (ref 0.57–1)
CRP SERPL-MCNC: <1 MG/L (ref 0–10)
CRYPTOSP DNA STL QL NAA+NON-PROBE: NOT DETECTED
E COLI O157 DNA STL QL NAA+NON-PROBE: NORMAL
E HISTOLYT DNA STL QL NAA+NON-PROBE: NOT DETECTED
EAEC PAA PLAS AGGR+AATA ST NAA+NON-PRB: NOT DETECTED
EC STX1+STX2 GENES STL QL NAA+NON-PROBE: NOT DETECTED
EGFR: 88 ML/MIN/1.73
EPEC EAE GENE STL QL NAA+NON-PROBE: NOT DETECTED
ERYTHROCYTE [DISTWIDTH] IN BLOOD BY AUTOMATED COUNT: 12.7 % (ref 11.7–15.4)
ERYTHROCYTE [SEDIMENTATION RATE] IN BLOOD BY WESTERGREN METHOD: 2 MM/HR (ref 0–32)
ETEC LTA+ST1A+ST1B TOX ST NAA+NON-PROBE: NOT DETECTED
G LAMBLIA DNA STL QL NAA+NON-PROBE: NOT DETECTED
GLOBULIN SER-MCNC: 1.9 G/DL (ref 1.5–4.5)
GLUCOSE SERPL-MCNC: 89 MG/DL (ref 70–99)
HCT VFR BLD AUTO: 43.3 % (ref 34–46.6)
HGB BLD-MCNC: 14.1 G/DL (ref 11.1–15.9)
MCH RBC QN AUTO: 29 PG (ref 26.6–33)
MCHC RBC AUTO-ENTMCNC: 32.6 G/DL (ref 31.5–35.7)
MCV RBC AUTO: 89 FL (ref 79–97)
NOROVIRUS GI+II RNA STL QL NAA+NON-PROBE: NOT DETECTED
P SHIGELLOIDES DNA STL QL NAA+NON-PROBE: NOT DETECTED
PLATELET # BLD AUTO: 242 X10E3/UL (ref 150–450)
POTASSIUM SERPL-SCNC: 4.3 MMOL/L (ref 3.5–5.2)
PROT SERPL-MCNC: 6.5 G/DL (ref 6–8.5)
RBC # BLD AUTO: 4.87 X10E6/UL (ref 3.77–5.28)
RVA RNA STL QL NAA+NON-PROBE: NOT DETECTED
S ENT+BONG DNA STL QL NAA+NON-PROBE: NOT DETECTED
SAPO I+II+IV+V RNA STL QL NAA+NON-PROBE: NOT DETECTED
SHIGELLA SP+EIEC IPAH ST NAA+NON-PROBE: NOT DETECTED
SODIUM SERPL-SCNC: 140 MMOL/L (ref 134–144)
V CHOL+PARA+VUL DNA STL QL NAA+NON-PROBE: NOT DETECTED
V CHOLERAE DNA STL QL NAA+NON-PROBE: NOT DETECTED
WBC # BLD AUTO: 4.6 X10E3/UL (ref 3.4–10.8)
Y ENTEROCOL DNA STL QL NAA+NON-PROBE: NOT DETECTED

## 2023-06-17 ENCOUNTER — NURSE TRIAGE (OUTPATIENT)
Dept: OTHER | Facility: OTHER | Age: 38
End: 2023-06-17

## 2023-06-17 NOTE — TELEPHONE ENCOUNTER
"  Reason for Disposition  • [1] Caller has NON-URGENT medicine question about med that PCP prescribed AND [2] triager unable to answer question    Answer Assessment - Initial Assessment Questions  QUESTION: Mandi Ceja is your question? \" (e g , medication refill, side effect)      Was seen in office discussed adding antacids as needed- unsure which medication was recommended  PRESCRIBING HCP: \"Who prescribed it? \" Reason: if prescribed by specialist, call should be referred to that group        GI    Protocols used: MEDICATION QUESTION CALL-ADULT-    "

## 2023-06-17 NOTE — TELEPHONE ENCOUNTER
"Regarding: Medication question  ----- Message from Sheila Edwards sent at 6/17/2023  1:22 PM EDT -----  \"I'm calling because the doctor required a medication but I can't remember the name and it's not in mychart either\"    "

## 2023-06-27 RX ORDER — OMEPRAZOLE 40 MG/1
40 CAPSULE, DELAYED RELEASE ORAL DAILY
Qty: 30 CAPSULE | Refills: 2 | Status: SHIPPED | OUTPATIENT
Start: 2023-06-27

## 2023-06-27 NOTE — TELEPHONE ENCOUNTER
Maurisio Casey RN 6/26/2023 2:47 PM EDT      ----- Message -----  From: Christel Carrasco  Sent: 6/25/2023 11:16 AM EDT  To: [facility]  Subject: Ekta Hopkins one more thing…do you think we can try Prilosec again for me? Maybe I need to tough it out longer this time, but my gastritis hasn’t improved with our current regimine  I’m having extra nausea even with the zofran and lots of burning even radiating to my back

## 2023-07-06 ENCOUNTER — TELEPHONE (OUTPATIENT)
Dept: GASTROENTEROLOGY | Facility: CLINIC | Age: 38
End: 2023-07-06

## 2023-07-06 NOTE — TELEPHONE ENCOUNTER
Regarding: Stephany Triana: [phone]  ----- Message from Ana Cristina Morales DO sent at 7/6/2023  1:11 PM EDT -----  Please add to cancellation list for an appointment with me in August     ----- Message sent from Ana Cristina Morales DO to Marj Moore at 7/6/2023  1:11 PM -----   Hi Merrilyn Goldmann -I will check with the schedulers to make sure you are on a cancellation list for August, but if symptoms are stable/well-controlled September would be okay.      ----- Message -----       From:Contreras Rosenthal       Sent:7/5/2023  6:10 AM EDT         To:Patient Medical Advice Request Message List    Subject:Macrobid    I called to schedule my follow up and they said any time after September 10th…so it’s scheduled for September 15th. I know when I saw you June 12th you mentioned 6-8 weeks out, so not sure if you still wanted to see me prior to our normal 3 month follow up. I also saw a neurologist this week so wanted to discuss that at our follow up.      ----- Message -----       From:Contreras Rosenthal       Sent:6/27/2023  2:01 PM EDT         To:Patient Medical Advice Request Message List    Subject:Macrobid    As of right now I do not have a follow up scheduled. I can ask my pcp to look into the nausea as well, bc I do not have an appetite which is not the norm for me. I have been having to force food down to maintain a stable weight. Thanks for the Prilosec refill.      ----- Message -----       Quin Morrow       Sent:6/27/2023  1:16 PM EDT         To:Contreras Rosenthal    Subject:Macrobid    I will send in a refill for the Prilosec    The overall health/infection aspect of things to be best addressed with your PCP  I have had some patients with GI symptoms like yours get additional dietary/nutrition help from a local nutritionist Abby Banks, I am not sure that she participates with insurance so cost is sometimes an obstacle.   Some patients have also identified additional food allergies/sensitivities through allergen testing with a local dermatologist, Dr. Maria Elena Moreland. We can discuss these options further at your next follow-up. Thanks.      ----- Message -----       From:Contreras Rosenthal       Sent:6/25/2023 11:16 AM EDT         To:Patient Medical Advice Request Message List    Subject:Macrobid    Sorry one more thing…do you think we can try Prilosec again for me? Maybe I need to tough it out longer this time, but my gastritis hasn’t improved with our current regimine. I’m having extra nausea even with the zofran and lots of burning even radiating to my back. ----- Message -----       From:Contreras Rosenthal       Sent:6/24/2023 10:30 AM EDT         To:Patient Medical Advice Request Message List    Subject:Macrobid    Thank you, I will let Dr. Gisella Durán know I decided to move forward with it. Also I know I asked in our appt with you think I have so many health issues and you had replied bad luck but do you think that’s really the case? I feel frustrated that I eat whole nutritious foods, exercise, drink a ton of water, am very hygienic, take vitamins, don’t drink or smoke etc and still feel unwell most days. I know a lot of my anxiety is from my gut health being off balance, but not sure why as a whole I haven’t felt right since May of 2022 and have continual infections. Would there be another doctor that you’d recommend for me? My PCP has run blood work, and has been a help as well. Thanks again for going out of your way to reply to me, and being an impeccable physician. I wish I could thank you more. I do leave good reviews and positive feedback on the surveys Omayra Berg sends after our visit, I just hope you are recognized for it!      ----- Message -----       Del Asp       Sent:6/23/2023  5:49 PM EDT         To:Contreras Rosenthal    Subject:Macrobid    Hi Debyona -   Overall, I think the benefits to your urologic symptoms outweigh the GI downside.   I agree with you starting on it, we will keep close track of any GI side effects and if they become pronounced, we can talk to Dr. Laura Gardner about an alternative. Thanks, have a good weekend.      ----- Message -----       From:Contreras Rosenthal       Sent:6/23/2023 12:00 PM EDT         To:Patient Medical Advice Request Message List    Subject:Juancarlosd    Hi Dr. Alvin Saint,    I had a follow up visit with Dr. Laura Gardner today and he mentioned using Macrodantin as a preventative therapy for six weeks. I wanted to get your opinion on GI side effects and if it would outweigh the urological benefits. Thanks       ----- Message -----       From:Contreras Rosenthal       Sent:6/19/2023  7:40 PM EDT         To:Patient Medical Advice Request Message List    Subject:Macrobid    Thanks       ----- Message -----       Hector Gimenez       Sent:6/19/2023  5:14 PM EDT         To:Contreras Rosenthal    Subject:Macrosarahd    [facility]   Let me know how the Gaviscon works. The initial inflammation may have just been from years of acid irritation, when it is already irritated there would be a low threshold for irritation from an NSAID    We previously had an answering service that would transcribe every message and send them to the on-call physician. Calls are now triaged by GI nurses that can take care of most of the basic calls and non-urgent issues or appointment rescheduling can be forwarded to be dealt with on Monday. Urgent calls are sent to the physicians. It really helps us to dedicate more time to hospitalized patients on-call/weekends. If you ever have a critical issue after hours, it will be sent to the on-call physician.        ----- Message -----       From:Contreras Rosenthal       Sent:6/19/2023  1:07 PM EDT         To:Patient Medical Advice Request Message List    Subject:Macrobid    Yes that is it! Thank you! I told the nurse I thought it started with a G but she wasn’t sure of what I was referring to.  My brother(Brayan Mclaughlin think you had seen him before) has had gastritis as well and I actually tried marshmallow root bc he said that had helped him( a more natural alternative) it helped a but not much. I’m still having that burning pain, nausea. I am not taking NSAIDS and rarely do if rarely have in the past, but do you think right now that could have been the causation? Also in the future is there a way to get ahold of you or the other doctors if it’s the weekend or after hours? I remember back when I was under Dr. Purcell Begun care  he or the other doctors were more available after hours but maybe that changed when [facility] took over. ----- Message -----       Narciso Gill       Sent:6/19/2023 12:45 PM EDT         To:Contreras Rosenthal    Subject:Macrobid    Hi Debbora -   I think you would benefit most from Gaviscon, it is available in liquid or chewable. Either would be effective for gastritis ( I usually prefer the liquid for esophagus symptoms). Please keep me posted on your symptoms. Hope you feel better soon      ----- Message -----       From:Contreras Rosenthal       Sent:6/18/2023 11:36 AM EDT         To:Patient Medical Advice Request Message List    Subject:Mindy Lofton,    I hope there comes a day when you only hear from me on our yearly check ups…which would mean I’m doing well GI wise! So Saturday I started with a really bad gastritis flare. It’s been burning all through the spot I showed you last Monday and down into my stomach with extreme nausea. I realized there was NSAIDS not only in the Cystex but a medication I was taking prior at the start of my UTI. I suspect this is the cause for the flare since I typically avoid these bc of my UC. I called your  office to try and reach the GI doctor on call bc I couldn’t find in our visit summary the name of the additional medication you had recommended for my gastritis. I remember it wasn’t tums. But a triage nurse called me back who wasn’t sure of the name.  She said she could reach out to Dr. Sam Escamilla if I really wanted but I told her I could reach you by portal Monday. So can you please send me the name of that medication? Also any other advice to work through this flare. Thanks       ----- Message -----       From:Contreras Rosenthal       Sent:6/16/2023  5:36 AM EDT         To:Patient Medical Advice Request Message List    Subject:Macrobid    Great, thank you! I know you have patients with severe cases and your time in the office is valuable/limited so I wanted to make sure I was not taking up someone else’s spot. I didn’t respond to the Cystex well even with the famotadine, so I am not going to continue use that. I do feel better for the most part UTI wise, but I do feel pursuing further testing with Dr. Good Aguilera would be beneficial which will be discussed in my next visit with him. Happy Fathers Day! Hope your girls are able to celebrate you!       ----- Message -----       From:Andreas Azevedo       Sent:6/15/2023  5:31 PM EDT         To:Contreras Rosenthal    Subject:Macrobid    Hi,   A/g ratio is not concerning, it just means your protein (albumin) level is good    I do not see the hematology note yet, it usually takes a day or 2 to enter the system. Even if his address to Dr. Marilou Farrell, it will show up for me  I agree with keeping the GI follow-up in 6 to 8 weeks. Thanks, have a good night      ----- Message -----       From:Contreras Rosenthal       Sent:6/15/2023 12:16 PM EDT         To:Patient Medical Advice Request Message List    Subject:Macrobid    Hi Dr. Kulwinder Azevedo,    I saw my blood work and it all looks excellent to me; I  had a question regarding the high “A/G ratio”, Is this concerning to you? Thanks. Also I saw Dr. Liz Becerril yesterday and I believe he faxed our visit/notes/plan to your office but it was addressed to Dr. Marilou Farrell as I was previously under his care. I will be getting iron infusions again starting next week.     Should I proceed with a visit with you in 6-8 weeks even though my blood work looks good? I think it’s a good idea since my GI health still seems to fluctuate, but please let me know your thoughts. Hope you have a great day.      ----- Message -----       From:Contreras Rosenthal       Sent:6/13/2023  1:54 PM EDT         To:Patient Medical Advice Request Message List    Subject:Macrobid    Thanks. He just messaged me regarding some test results and I asked him about it’s effectiveness. I just took a dose, and I’ll continue it if he finds it effective. I’m sure I’ll hear back soon as he usually messages back promptly.      ----- Message -----       Sadaf Colton       Sent:6/13/2023 12:39 PM EDT         To:Contreras Rosenthal    Subject:Macrobid    Hi, I looked up Cystex. its ingredients and doses are okay with UC (since our recent inflammatory markers are normal) and gastritis as long as you continue the famotidine. If upset stomach develops, you can use an antacid as-needed and we'd assess whether it is worth continuing the Cystex. I can't address the effectiveness of the Cystex, that would be a better question for Dr. Mane Hawkins.      ----- Message -----       From:Contreras Rojas [place]       Sent:6/13/2023  8:31 AM EDT         Anne-Marie Monreal List    Subject:Macrobid    Hi Dr. Hinton Cooks yesterday was the first time since the start of my UTI(3.5 weeks) that I had intercourse since my UTI was active and of course today I woke up feeling like a UTI was coming on. Katy Salazar gave me an antibiotic to have on hand incase at some point I felt like i was getting one, but I really don’t want to resort to that right away, so I was wondering if “Cystex” is okay to take bc it has NSAIDS in it, but also states it controls bacteria, so I want to give this a try first. Do you think a few days of NSAIDS is okay with my UC and gastritis? Maybe what I’m feeling is just risidual inflammation from my UTI I just had, but I’m not too sure.  Can you answer this for me or should I reach out to Dr. Michele Springer?       ----- Message -----       From:Contreras Burgos       Sent:6/12/2023  2:23 PM EDT         To:Patient Medical Advice Request Message List    Subject:Macrobid    Thank you! It has been a great day so far; my son is finished  and it’s so nice having him home all day :)     I’m sorry I never had the ultrasound results forwarded to you; I was planning on doing it outpatient but it ended up happening in the ER and I forgot to ask the ER doctor to send to you. I can link my account with you to my account to [facility] so you can be updated on my care from Dr. Michele Springer. Have a great night!       ----- Message -----       Lorenza Green       Sent:6/12/2023  2:13 PM EDT         To:Contreras Rosenthal    Subject:Macrobid    Thanks, I will make sure they contact you when an appointment opens up. Have a good day      ----- Message -----       From:Contreras Rosenthal       Sent:6/12/2023 12:37 PM EDT         To:Patient Medical Advice Request Message List    Subject:Macrobid    Thanks, I’ll start taking it, I figure it can’t be worse than my ongoing GI symptoms, and is worth preventing these UTIs. I do every other measure to prevent them, so hopefully this helps. Thanks for the update on the blood work, I’m glad the results are positive so far! Hopefully my immune system can fight future infections so I can avoid antibodies for a little while. Can you please make sure I’m on the cancellation list for 6-8 weeks out so I don’t have to wait as long as before? Thanks again for being an excellent physician, I really appreciate your care.      ----- Message -----       Lorenza Distad       Sent:6/12/2023 12:23 PM EDT         To:Contreras Rosenthal    Subject:Macrobid    Thanks, I will add that medication to your list    D-Mannose can cause nausea or diarrhea, probably more common in patients with underlying IBS.   There is no guarantee that you will get the side effects, so it is probably worth a trial to see if it can prevent these recurrent UTIs. The nurses were able to track down your [facility] report. The calprotectin is still pending, so that is probably results were not released yet. Results are negative so far, thankfully stool culture and C. difficile are negative with your recent antibiotics. Sed rate and C-reactive protein are negative, so no immediate suggestion for GI inflammation. I will be in touch when the calprotectin is resulted. Thanks.   Have a good day

## 2023-07-25 DIAGNOSIS — K29.70 GASTRITIS DETERMINED BY ENDOSCOPY: ICD-10-CM

## 2023-07-25 RX ORDER — OMEPRAZOLE 40 MG/1
40 CAPSULE, DELAYED RELEASE ORAL DAILY
Qty: 90 CAPSULE | Refills: 1 | Status: SHIPPED | OUTPATIENT
Start: 2023-07-25

## 2023-08-21 ENCOUNTER — PATIENT MESSAGE (OUTPATIENT)
Dept: GASTROENTEROLOGY | Facility: CLINIC | Age: 38
End: 2023-08-21

## 2023-08-21 DIAGNOSIS — K51.00 ULCERATIVE PANCOLITIS WITHOUT COMPLICATION (HCC): Primary | ICD-10-CM

## 2023-08-21 RX ORDER — CHOLESTYRAMINE 4 G/9G
1 POWDER, FOR SUSPENSION ORAL
Qty: 14 PACKET | Refills: 0 | Status: SHIPPED | OUTPATIENT
Start: 2023-08-21

## 2023-08-21 NOTE — TELEPHONE ENCOUNTER
From: Fitz Grande  To: Sukhmarislade Celeste  Sent: 8/21/2023 2:35 PM EDT  Subject: Landon Neff,    My appt with you is still almost a month away and I am almost certain I have cdiff. Today I have had diarrhea six times and it is exactly how I remember cdiff to be. I know I was on a lot of antibiotics this spring and I remember cdiff happening a few months after my antibiotics when I had it in 2016. Can you advise what I should do? I have been seeing a neurologist over the past couple of months and after 2 MRIs (brain and spine) and an EMG they found a possible arachnoid cyst, but my primary wants me to get a second option at [facility], so that has sort of took precedence over my GI issues but has caused a lot of anxiety induced GI symptoms so It is hard to tell the causation of the frequency of my bowels. Thanks in advance for getting back to me.

## 2023-08-23 ENCOUNTER — TELEPHONE (OUTPATIENT)
Dept: GASTROENTEROLOGY | Facility: CLINIC | Age: 38
End: 2023-08-23

## 2023-08-23 DIAGNOSIS — A04.72 C. DIFFICILE DIARRHEA: Primary | ICD-10-CM

## 2023-08-23 LAB — C DIFF TOX GENS STL QL NAA+PROBE: POSITIVE

## 2023-08-23 RX ORDER — VANCOMYCIN HYDROCHLORIDE 125 MG/1
125 CAPSULE ORAL 4 TIMES DAILY
Qty: 56 CAPSULE | Refills: 0 | Status: SHIPPED | OUTPATIENT
Start: 2023-08-23 | End: 2023-09-06

## 2023-08-23 NOTE — TELEPHONE ENCOUNTER
Per Dr. Al Andres (tiger text to me) -  Manjit Wilson- I checked the epic vira and saw that Meliza petersen’s cdiff is positive. Can you ask one of the NPs to send in 14 days of vanco? Thanks.

## 2023-08-25 ENCOUNTER — PATIENT MESSAGE (OUTPATIENT)
Dept: GASTROENTEROLOGY | Facility: CLINIC | Age: 38
End: 2023-08-25

## 2023-08-25 DIAGNOSIS — A04.72 C. DIFFICILE DIARRHEA: Primary | ICD-10-CM

## 2023-08-29 RX ORDER — VALACYCLOVIR HYDROCHLORIDE 1 G/1
TABLET, FILM COATED ORAL
COMMUNITY
Start: 2023-08-21 | End: 2023-08-30 | Stop reason: ALTCHOICE

## 2023-08-29 RX ORDER — PROPRANOLOL HYDROCHLORIDE 40 MG/1
TABLET ORAL
COMMUNITY
Start: 2023-08-12 | End: 2023-08-30 | Stop reason: ALTCHOICE

## 2023-08-29 RX ORDER — OXYBUTYNIN CHLORIDE 10 MG/1
10 TABLET, EXTENDED RELEASE ORAL DAILY
COMMUNITY
Start: 2023-06-08 | End: 2023-08-30 | Stop reason: ALTCHOICE

## 2023-08-29 RX ORDER — ALBUTEROL SULFATE 90 UG/1
AEROSOL, METERED RESPIRATORY (INHALATION)
COMMUNITY
Start: 2023-04-04

## 2023-08-30 ENCOUNTER — OFFICE VISIT (OUTPATIENT)
Dept: GASTROENTEROLOGY | Facility: CLINIC | Age: 38
End: 2023-08-30
Payer: COMMERCIAL

## 2023-08-30 VITALS
DIASTOLIC BLOOD PRESSURE: 74 MMHG | SYSTOLIC BLOOD PRESSURE: 106 MMHG | HEIGHT: 65 IN | BODY MASS INDEX: 16.83 KG/M2 | WEIGHT: 101 LBS

## 2023-08-30 DIAGNOSIS — K51.00 ULCERATIVE PANCOLITIS WITHOUT COMPLICATION (HCC): ICD-10-CM

## 2023-08-30 DIAGNOSIS — A04.72 C. DIFFICILE COLITIS: Primary | ICD-10-CM

## 2023-08-30 PROCEDURE — 99214 OFFICE O/P EST MOD 30 MIN: CPT | Performed by: INTERNAL MEDICINE

## 2023-08-30 NOTE — PROGRESS NOTES
[facility] - Outpatient Follow-up Note  Josh Mckay 41 y.o. female MRN: [id number]  Encounter: [id number]    ASSESSMENT AND PLAN:      1. C. difficile colitis  Contacted me via the patient portal last week with progressive diarrhea and fever. Stool positive for C. difficile toxin. Gradually improving with oral vancomycin (day 6 today)    Continue vancomycin 4x daily for 14 days  If symptoms continue or worsen, she will contact me immediately and we will prescribe Dificid. Continue Questran at bedtime, okay to increase twice daily for symptomatic relief    Plan short interval office follow-up. 2. Ulcerative pancolitis without complication ([address])  Diagnosed 2013 and treated with mesalamine. Calvin Mckoy had a flare-up in 2014 associated with C diff. off medications for years before a flare-up in July/August 2022.  Treated with prednisone, colonoscopy September 2022 showed mild pancolitis, ileal biopsy showed active inflammation but no signs of chronicity. Small bowel capsule negative for small bowel inflammation    She had a viral colitis earlier this year, and now C. Difficile  We discussed optimizing UC therapy. At her next follow-up will plan to check inflammatory markers and discuss colonoscopy    Followup Appointment: 2 to 3 weeks  ______________________________________________________________________    Chief Complaint   Patient presents with   • Follow-up     Pt is still experiencing diarrhea, cramping and nausea. Not currently experiencing any bleeding. HPI: The patient is seen urgently due to the differential, she had a prior bout in 2014 and symptoms were similar. She had 8-10 loose, urgent stools daily, associated with blood initially. She had intermittent fevers. She contacted me via the patient portal.  C. difficile returned positive on August 23. She was prescribed vancomycin. Symptoms are starting to improve.   She is down to about 5 stools daily and they are starting to form up. He had no further fevers or bleeding since starting the vancomycin. She still has urgency after meals and urgency has awakened her at night. She had multiple courses of antibiotics throughout the spring and summer.     Historical Information   Past Medical History:   Diagnosis Date   • Anemia    • Anxiety     managed by PCP   • C. difficile colitis 2016    during pregnancy   • Irritable bowel syndrome    • Lactose intolerance 2021   • LGSIL on Pap smear of cervix     had leep 2016   • Ulcerative colitis ([address])     Dr. Mera Speaks     Past Surgical History:   Procedure Laterality Date   • CERVICAL BIOPSY  W/ LOOP ELECTRODE EXCISION  2016   • COLONOSCOPY     • COLONOSCOPY  09/06/2022    mild pancolitis   • WISDOM TOOTH EXTRACTION  2000     Social History     Substance and Sexual Activity   Alcohol Use Never     Social History     Substance and Sexual Activity   Drug Use Never    Comment: No use     Social History     Tobacco Use   Smoking Status Never   Smokeless Tobacco Never     Family History   Problem Relation Age of Onset   • Heart disease Father    • Hypertension Father    • Thyroid disease Father    • Anemia Sister    • Depression Sister    • Inflammatory bowel disease Sister    • Breast cancer Sister    • Irritable bowel syndrome Sister    • Ulcerative colitis Sister    • No Known Problems Sister    • Breast cancer Sister 43   • No Known Problems Sister    • Colon polyps Brother    • Depression Brother    • No Known Problems Brother    • No Known Problems Daughter    • No Known Problems Son    • Colon cancer Neg Hx    • Ovarian cancer Neg Hx    • Uterine cancer Neg Hx    • Prostate cancer Neg Hx    • Pancreatic cancer Neg Hx          Current Outpatient Medications:   •  albuterol (PROVENTIL HFA,VENTOLIN HFA) 90 mcg/act inhaler  •  cholestyramine (QUESTRAN) 4 g packet  •  famotidine (PEPCID) 40 MG tablet  •  LORazepam (ATIVAN) 0.5 mg tablet  •  mesalamine (LIALDA) 1.2 g EC tablet  •  ondansetron (ZOFRAN-ODT) 8 mg disintegrating tablet  •  patient supplied medication  •  sertraline (ZOLOFT) 100 mg tablet  •  traZODone (DESYREL) 100 mg tablet  •  vancomycin (VANCOCIN) 125 MG capsule  •  omeprazole (PriLOSEC) 40 MG capsule  •  oxybutynin (DITROPAN-XL) 10 MG 24 hr tablet  •  propranolol (INDERAL) 40 mg tablet  •  valACYclovir (VALTREX) 1,000 mg tablet  Allergies   Allergen Reactions   • Amoxicillin Other (See Comments), Diarrhea, GI Bleeding and GI Intolerance     C-diff, per pt  C-diff, per pt   • Ibuprofen Other (See Comments)     Ulcerative colitis, per pt  Cannot take ibuprofen due to ulcerative colitis     Reviewed medications and allergies and updated as indicated    PHYSICAL EXAM:    Blood pressure 106/74, height 5' 4.5" (1.638 m), weight 45.8 kg (101 lb), not currently breastfeeding. Body mass index is 17.07 kg/m². General Appearance: NAD, cooperative, alert  Eyes: Anicteric, PERRLA, EOMI  ENT:  Normocephalic, atraumatic, normal mucosa. Neck:  Supple, symmetrical, trachea midline  Resp:  Clear to auscultation bilaterally; no rales, rhonchi or wheezing; respirations unlabored   CV:  S1 S2, Regular rate and rhythm; no murmur, rub, or gallop. GI:  Soft, non-tender, non-distended; normal bowel sounds; no masses, no organomegaly   Rectal: Deferred  Musculoskeletal: No cyanosis, clubbing or edema. Normal ROM.   Skin:  No jaundice, rashes, or lesions   Heme/Lymph: No palpable cervical lymphadenopathy  Psych: Normal affect, good eye contact  Neuro: No gross deficits, AAOx3    Lab Results:   Lab Results   Component Value Date    WBC 4.6 06/06/2023    HGB 14.1 06/06/2023    HCT 43.3 06/06/2023    MCV 89 06/06/2023     06/06/2023     Lab Results   Component Value Date    K 4.3 06/06/2023     06/06/2023    CO2 23 06/06/2023    BUN 11 06/06/2023    CREATININE 0.87 06/06/2023    AST 26 06/06/2023    ALT 21 06/06/2023    EGFR 88 06/06/2023     No results found for: "IRON", "TIBC", "FERRITIN"  No results found for: "LIPASE"    Radiology Results:   No results found.     Answers for HPI/ROS submitted by the patient on 8/23/2023  When you are not experiencing symptoms of your inflammatory bowel disease, how many bowel movements do you typically have each day?: 3  What is the average (typical) number of bowel movements that you had in a single day during the last week?: 5  Over the last 3 days, have you had any bowel movements where you passed blood without stool?: No  Since your last visit, have you received any vaccinations?: No  Since the last visit, have you had an infection?: No  Is there any possibility that you may be pregnant?: No  In the past three months, have you used tobacco in any form?: No  During the last year, how many days have you missed work or school because of your inflammatory bowel disease?: 0  During the last year, how many days have you been hospitalized because of your inflammatory bowel disease?: 0  During the last year, how many days have you visited a hospital emergency department because of your inflammatory bowel disease?: 0  During the last month, have you taken narcotic pain medications (such as Percocet, oxycodone, Oxycontin, morphine, Vicodin, Dilaudid, MS Contin) for your inflammatory bowel disease?: No  Have you awoken at night because you needed to move your bowels during the last month? : Yes  Have you had leakage of stool while sleeping during the last month?: No  Have you had leakage of stool while you were awake during the last month?: No  In the last 6 months, have you unintentionally lost weight?: No  Fever: Yes  During the last 3 days, have you had eye irritation?: No  Mouth sores: Yes  Sore throat: No  Chest pain: No  Shortness of breath: Yes  Numbness or tingling in your hands or feet: Yes  Skin rash: No  Pain or swelling in your joints: Yes  Bruising or bleeding: Yes  Felt depressed or blue: No

## 2023-09-01 NOTE — TELEPHONE ENCOUNTER
From: Kelli Medrano  To: Jorge Anthony  Sent: 8/25/2023 7:26 AM EDT  Subject: Update    Hi Dr. Natalio García,    I know you asked me to keep you posted-I sent an update yesterday but I’m not sure if you saw it. Overnight my temp went down to 99.3 (my baseline is 97.3 and my temp was at 100.5 in the beginning of the week) so hopefully that means the medicine is working. I still feel lightheaded and generally unwell, due to the frequency of my bowel movements but hoping the medicine kicks in more today since it will be 48 hours of the medicine. I asked in a previous message about probiotics. Should I just skip them until I’m done the Vanco? I don’t want them to cause the vanco to be less effective since I’m taking it 4 times a day.

## 2023-09-01 NOTE — PATIENT COMMUNICATION
Discussed patient's concerns by phone. If Dificid is not available this weekend she will continue the vancomycin 4 times daily and Questran twice daily    If the Dificid becomes available and her symptoms have stabilized by then she will discontinue the Vanco    If the Dificid becomes available and symptoms are continuing, she will stop the Vanco and transition to Dificid    She will update me via the patient portal early next week.   If symptoms are severe she will call the physician on-call

## 2023-09-05 DIAGNOSIS — K51.00 ULCERATIVE PANCOLITIS WITHOUT COMPLICATION (HCC): ICD-10-CM

## 2023-09-05 RX ORDER — CHOLESTYRAMINE 4 G/9G
1 POWDER, FOR SUSPENSION ORAL
Qty: 14 PACKET | Refills: 0 | Status: SHIPPED | OUTPATIENT
Start: 2023-09-05

## 2023-09-13 ENCOUNTER — PATIENT MESSAGE (OUTPATIENT)
Dept: GASTROENTEROLOGY | Facility: CLINIC | Age: 38
End: 2023-09-13

## 2023-09-15 ENCOUNTER — TELEPHONE (OUTPATIENT)
Dept: GASTROENTEROLOGY | Facility: CLINIC | Age: 38
End: 2023-09-15

## 2023-09-15 ENCOUNTER — OFFICE VISIT (OUTPATIENT)
Dept: GASTROENTEROLOGY | Facility: CLINIC | Age: 38
End: 2023-09-15
Payer: COMMERCIAL

## 2023-09-15 VITALS
SYSTOLIC BLOOD PRESSURE: 100 MMHG | DIASTOLIC BLOOD PRESSURE: 60 MMHG | HEIGHT: 64 IN | WEIGHT: 100 LBS | BODY MASS INDEX: 17.07 KG/M2

## 2023-09-15 DIAGNOSIS — K51.00 ULCERATIVE PANCOLITIS WITHOUT COMPLICATION (HCC): Primary | ICD-10-CM

## 2023-09-15 DIAGNOSIS — A04.72 C. DIFFICILE COLITIS: ICD-10-CM

## 2023-09-15 PROCEDURE — 99214 OFFICE O/P EST MOD 30 MIN: CPT | Performed by: INTERNAL MEDICINE

## 2023-09-15 NOTE — TELEPHONE ENCOUNTER
Scheduled date of EGD(as of today):11-9-23  Physician performingBMEC  Instructions reviewed with patient by:MISHA  Clearances: no

## 2023-09-15 NOTE — PROGRESS NOTES
[facility] - Outpatient Follow-up Note  Claudean Milan 41 y.o. female MRN: [id number]  Encounter: [id number]    ASSESSMENT AND PLAN:      1. C. difficile colitis  Developed C. difficile in the setting of antibiotics. Improved with vancomycin and Jami  Still has some post inflammatory IBS type symptoms but no further nocturnal diarrhea  She filled the Dificid but did not require it, will hold off unless symptoms recur     2. Ulcerative pancolitis without complication ([address])  Diagnosed 2013 and treated with mesalamine. Salima Monroe had a flare-up in 2014 associated with C diff. off medications for years before a flare-up in July/August 2022.  Treated with prednisone, colonoscopy September 2022 showed mild pancolitis, ileal biopsy showed active inflammation but no signs of chronicity.  Small bowel capsule negative for small bowel inflammation     She had a viral colitis December 2022  and now C. difficile. Inflammatory markers are normal, but we will check colonoscopy now to assess mucosal healing. If there is ongoing inflammation despite mesalamine we will escalate therapy    Followup Appointment: Pending colonoscopy  ______________________________________________________________________    Chief Complaint   Patient presents with   • Follow-up     C-diff     HPI: The patient returns for follow-up on ulcerative colitis and C. difficile. The C. difficile symptoms are finally improving. She is down to about 6 stools daily with no further nocturnal complaints. She is tolerating her diet. She still has discomfort in her lower abdomen. She has a sense of incomplete emptying but there is no rectal bleeding.   She is been following with neurosurgery about nerve pain, and thoracolumbar MRI is planned    Historical Information   Past Medical History:   Diagnosis Date   • Anemia    • Anxiety     managed by PCP   • C. difficile colitis 2016    during pregnancy   • Irritable bowel syndrome • Lactose intolerance 2021   • LGSIL on Pap smear of cervix     had leep 2016   • Ulcerative colitis ([address])     Dr. Lester Jordan     Past Surgical History:   Procedure Laterality Date   • CERVICAL BIOPSY  W/ LOOP ELECTRODE EXCISION  2016   • COLONOSCOPY     • COLONOSCOPY  09/06/2022    mild pancolitis   • WISDOM TOOTH EXTRACTION  2000     Social History     Substance and Sexual Activity   Alcohol Use Never     Social History     Substance and Sexual Activity   Drug Use Never    Comment: No use     Social History     Tobacco Use   Smoking Status Never   Smokeless Tobacco Never     Family History   Problem Relation Age of Onset   • Heart disease Father    • Hypertension Father    • Thyroid disease Father    • Anemia Sister    • Depression Sister    • Inflammatory bowel disease Sister    • Breast cancer Sister    • Irritable bowel syndrome Sister    • Ulcerative colitis Sister    • No Known Problems Sister    • Breast cancer Sister 43   • No Known Problems Sister    • Colon polyps Brother    • Depression Brother    • No Known Problems Brother    • No Known Problems Daughter    • No Known Problems Son    • Colon cancer Neg Hx    • Ovarian cancer Neg Hx    • Uterine cancer Neg Hx    • Prostate cancer Neg Hx    • Pancreatic cancer Neg Hx          Current Outpatient Medications:   •  albuterol (PROVENTIL HFA,VENTOLIN HFA) 90 mcg/act inhaler  •  famotidine (PEPCID) 40 MG tablet  •  LORazepam (ATIVAN) 0.5 mg tablet  •  mesalamine (LIALDA) 1.2 g EC tablet  •  ondansetron (ZOFRAN-ODT) 8 mg disintegrating tablet  •  patient supplied medication  •  sertraline (ZOLOFT) 100 mg tablet  •  sodium picosulfate, magnesium oxide, citric acid (Clenpiq) oral solution  •  traZODone (DESYREL) 100 mg tablet  •  cholestyramine (QUESTRAN) 4 g packet  Allergies   Allergen Reactions   • Amoxicillin Other (See Comments), Diarrhea, GI Bleeding and GI Intolerance     C-diff, per pt  C-diff, per pt   • Ibuprofen Other (See Comments) Ulcerative colitis, per pt  Cannot take ibuprofen due to ulcerative colitis     Reviewed medications and allergies and updated as indicated    PHYSICAL EXAM:    Blood pressure 100/60, height 5' 4" (1.626 m), weight 45.4 kg (100 lb), not currently breastfeeding. Body mass index is 17.16 kg/m². General Appearance: NAD, cooperative, alert  Eyes: Anicteric, PERRLA, EOMI  ENT:  Normocephalic, atraumatic, normal mucosa. Neck:  Supple, symmetrical, trachea midline  Resp:  Clear to auscultation bilaterally; no rales, rhonchi or wheezing; respirations unlabored   CV:  S1 S2, Regular rate and rhythm; no murmur, rub, or gallop. GI:  Soft, non-tender, non-distended; normal bowel sounds; no masses, no organomegaly   Rectal: Deferred  Musculoskeletal: No cyanosis, clubbing or edema. Normal ROM. Skin:  No jaundice, rashes, or lesions   Heme/Lymph: No palpable cervical lymphadenopathy  Psych: Normal affect, good eye contact  Neuro: No gross deficits, AAOx3    Lab Results:   Lab Results   Component Value Date    WBC 4.6 06/06/2023    HGB 14.1 06/06/2023    HCT 43.3 06/06/2023    MCV 89 06/06/2023     06/06/2023     Lab Results   Component Value Date    K 4.3 06/06/2023     06/06/2023    CO2 23 06/06/2023    BUN 11 06/06/2023    CREATININE 0.87 06/06/2023    AST 26 06/06/2023    ALT 21 06/06/2023    EGFR 88 06/06/2023     No results found for: "IRON", "TIBC", "FERRITIN"  No results found for: "LIPASE"    Radiology Results:   No results found.       Answers for HPI/ROS submitted by the patient on 9/8/2023  When you are not experiencing symptoms of your inflammatory bowel disease, how many bowel movements do you typically have each day?: 3  What is the average (typical) number of bowel movements that you had in a single day during the last week?: 6  Over the last 3 days, have you had any bowel movements where you passed blood without stool?: No  Since your last visit, have you received any vaccinations?: No  Since the last visit, have you had an infection?: No  Is there any possibility that you may be pregnant?: Yes  In the past three months, have you used tobacco in any form?: No  During the last year, how many days have you missed work or school because of your inflammatory bowel disease?: 0  During the last year, how many days have you been hospitalized because of your inflammatory bowel disease?: 0  During the last year, how many days have you visited a hospital emergency department because of your inflammatory bowel disease?: 0  During the last month, have you taken narcotic pain medications (such as Percocet, oxycodone, Oxycontin, morphine, Vicodin, Dilaudid, MS Contin) for your inflammatory bowel disease?: No  Have you awoken at night because you needed to move your bowels during the last month? : Yes  Have you had leakage of stool while sleeping during the last month?: No  Have you had leakage of stool while you were awake during the last month?: No  In the last 6 months, have you unintentionally lost weight?: Yes  Fever: Yes  Eye irritation: No  Mouth sores: Yes  Sore throat: No  Chest pain: No  Shortness of breath: Yes  Numbness or tingling in your hands or feet: Yes  Skin rash: No  Pain or swelling in your joints: Yes  Bruising or bleeding: No  Felt depressed or blue: No

## 2023-09-16 DIAGNOSIS — K29.70 GASTRITIS DETERMINED BY ENDOSCOPY: ICD-10-CM

## 2023-09-18 RX ORDER — FAMOTIDINE 40 MG/1
TABLET, FILM COATED ORAL
Qty: 60 TABLET | Refills: 5 | Status: SHIPPED | OUTPATIENT
Start: 2023-09-18

## 2023-09-19 NOTE — TELEPHONE ENCOUNTER
Echo Martinez 9/18/2023 1:22 PM EDT      ----- Message -----  From: Cindi Wilkerson  Sent: 9/18/2023 8:25 AM EDT  To: [facility]  Subject: Neurosurgeon update     One thing I forgot to ask about was my nausea every time I eat, and when I am not eating as well. Zofran helps a bit. But this is post antibiotics, post cdiff, etc…. is this just because of my IBD?

## 2023-09-20 ENCOUNTER — TELEPHONE (OUTPATIENT)
Dept: GASTROENTEROLOGY | Facility: CLINIC | Age: 38
End: 2023-09-20

## 2023-10-10 ENCOUNTER — TELEPHONE (OUTPATIENT)
Age: 38
End: 2023-10-10

## 2023-10-10 NOTE — TELEPHONE ENCOUNTER
MyChart message received regarding cancelling upcoming EGD/Colonoscopy. Message left to call back to cancel and check if the patient would like to reschedule.

## 2023-10-17 DIAGNOSIS — K51.30 ULCERATIVE RECTOSIGMOIDITIS WITHOUT COMPLICATION (HCC): ICD-10-CM

## 2023-10-17 RX ORDER — MESALAMINE 1.2 G/1
TABLET, DELAYED RELEASE ORAL
Qty: 120 TABLET | Refills: 0 | Status: SHIPPED | OUTPATIENT
Start: 2023-10-17

## 2023-10-26 ENCOUNTER — ANESTHESIA EVENT (OUTPATIENT)
Dept: ANESTHESIOLOGY | Facility: AMBULATORY SURGERY CENTER | Age: 38
End: 2023-10-26

## 2023-10-26 ENCOUNTER — ANESTHESIA (OUTPATIENT)
Dept: ANESTHESIOLOGY | Facility: AMBULATORY SURGERY CENTER | Age: 38
End: 2023-10-26

## 2023-11-01 ENCOUNTER — OFFICE VISIT (OUTPATIENT)
Age: 38
End: 2023-11-01
Payer: COMMERCIAL

## 2023-11-01 VITALS
WEIGHT: 103.6 LBS | SYSTOLIC BLOOD PRESSURE: 106 MMHG | BODY MASS INDEX: 17.69 KG/M2 | DIASTOLIC BLOOD PRESSURE: 60 MMHG | HEIGHT: 64 IN

## 2023-11-01 DIAGNOSIS — K21.9 GASTROESOPHAGEAL REFLUX DISEASE WITHOUT ESOPHAGITIS: ICD-10-CM

## 2023-11-01 DIAGNOSIS — K51.00 ULCERATIVE PANCOLITIS WITHOUT COMPLICATION (HCC): Primary | ICD-10-CM

## 2023-11-01 PROCEDURE — 99214 OFFICE O/P EST MOD 30 MIN: CPT | Performed by: INTERNAL MEDICINE

## 2023-11-01 NOTE — PROGRESS NOTES
[facility] - Outpatient Follow-up Note  Delia Monroe 41 y.o. female MRN: [id number]  Encounter: [id number]    ASSESSMENT AND PLAN:      1. Ulcerative pancolitis without complication ([address])  Diagnosed 2013 and treated with mesalamine. Also had a flare-up in 2014 associated with C diff. off medications for years before a flare-up in July/August 2022. Treated with prednisone, colonoscopy September 2022 showed mild pancolitis, ileal biopsy showed active inflammation but no signs of chronicity. Small bowel capsule negative for small bowel inflammation     She had a viral colitis December 2022, then C. difficile in August 2023. Inflammatory markers normal.    Symptoms at baseline with about 3 urgent nonbloody stools daily associated with left-sided abdominal discomfort. It is unclear how much of her symptoms are due to active IBD versus a postinfectious IBS  We will plan to proceed with colonoscopy to assess for mucosal healing, we may require escalation to a biologic if inflammation persists. 2. Gastroesophageal reflux disease without esophagitis  Fair control with an H2 blocker, but recent issues with epigastric discomfort and reflux. I recommended resuming pantoprazole for 2 weeks and monitoring response to symptoms. 3.  Family planning-the patient and her  are actively trying to get pregnant. Colonoscopy is tentatively scheduled for Thursday, November 9. If pregnancy test is positive in the interim she will notify the office      Followup Appointment: 2 months  ______________________________________________________________________    Chief Complaint   Patient presents with    Follow-up     HPI: The patient presents for follow-up on ulcerative colitis. She was last seen in the office September 15, with multiple patient portal encounter since then. Stools are at baseline of about 3 urgent soft stools daily. There are no nocturnal or postprandial symptoms. She denies any food triggers. She has no fevers or chills. Appetite is good. She has had vague intermittent pain throughout the abdomen more pronounced in the left lower quadrant and in the epigastrium. She also has some substernal burning occasionally. The patient was recently informed that she lost secondary insurance so she is reliant on a high deductible plan. The patient and her  are working on family-planning and hoping for an upcoming pregnancy.     Historical Information   Past Medical History:   Diagnosis Date    Anemia     Anxiety     managed by PCP    C. difficile colitis 2016    during pregnancy    Irritable bowel syndrome     Lactose intolerance 2021    LGSIL on Pap smear of cervix     had leep 2016    Ulcerative colitis ([address])     Dr. Rae Cerna     Past Surgical History:   Procedure Laterality Date    CERVICAL BIOPSY  W/ LOOP ELECTRODE EXCISION  2016    COLONOSCOPY      COLONOSCOPY  09/06/2022    mild pancolitis    WISDOM TOOTH EXTRACTION  2000     Social History     Substance and Sexual Activity   Alcohol Use Never     Social History     Substance and Sexual Activity   Drug Use Never    Comment: No use     Social History     Tobacco Use   Smoking Status Never    Passive exposure: Never   Smokeless Tobacco Never     Family History   Problem Relation Age of Onset    Heart disease Father     Hypertension Father     Thyroid disease Father     Anemia Sister     Depression Sister     Inflammatory bowel disease Sister     Breast cancer Sister     Irritable bowel syndrome Sister     Ulcerative colitis Sister     No Known Problems Sister     Breast cancer Sister 43    No Known Problems Sister     Colon polyps Brother     Depression Brother     No Known Problems Brother     No Known Problems Daughter     No Known Problems Son     Colon cancer Neg Hx     Ovarian cancer Neg Hx     Uterine cancer Neg Hx     Prostate cancer Neg Hx     Pancreatic cancer Neg Hx          Current Outpatient Medications: albuterol (PROVENTIL HFA,VENTOLIN HFA) 90 mcg/act inhaler    cholestyramine (QUESTRAN) 4 g packet    famotidine (PEPCID) 40 MG tablet    LORazepam (ATIVAN) 0.5 mg tablet    mesalamine (LIALDA) 1.2 g EC tablet    ondansetron (ZOFRAN-ODT) 8 mg disintegrating tablet    patient supplied medication    sertraline (ZOLOFT) 100 mg tablet    sodium picosulfate, magnesium oxide, citric acid (Clenpiq) oral solution    traZODone (DESYREL) 100 mg tablet  Allergies   Allergen Reactions    Amoxicillin Other (See Comments), Diarrhea, GI Bleeding and GI Intolerance     C-diff, per pt  C-diff, per pt    Ibuprofen Other (See Comments)     Ulcerative colitis, per pt  Cannot take ibuprofen due to ulcerative colitis     Reviewed medications and allergies and updated as indicated    PHYSICAL EXAM:    Blood pressure 106/60, height 5' 4" (1.626 m), weight 47 kg (103 lb 9.6 oz), not currently breastfeeding. Body mass index is 17.78 kg/m². General Appearance: NAD, cooperative, alert  Eyes: Anicteric, PERRLA, EOMI  ENT:  Normocephalic, atraumatic, normal mucosa. Neck:  Supple, symmetrical, trachea midline  Resp:  Clear to auscultation bilaterally; no rales, rhonchi or wheezing; respirations unlabored   CV:  S1 S2, Regular rate and rhythm; no murmur, rub, or gallop. GI:  Soft, non-tender, non-distended; normal bowel sounds; no masses, no organomegaly   Rectal: Deferred  Musculoskeletal: No cyanosis, clubbing or edema. Normal ROM.   Skin:  No jaundice, rashes, or lesions   Heme/Lymph: No palpable cervical lymphadenopathy  Psych: Normal affect, good eye contact  Neuro: No gross deficits, AAOx3    Lab Results:   Lab Results   Component Value Date    WBC 4.6 06/06/2023    HGB 14.1 06/06/2023    HCT 43.3 06/06/2023    MCV 89 06/06/2023     06/06/2023     Lab Results   Component Value Date    K 4.3 06/06/2023     06/06/2023    CO2 23 06/06/2023    BUN 11 06/06/2023    CREATININE 0.87 06/06/2023    AST 26 06/06/2023    ALT 21 06/06/2023    EGFR 88 06/06/2023     No results found for: "IRON", "TIBC", "FERRITIN"  No results found for: "LIPASE"    Radiology Results:   No results found. Answers submitted by the patient for this visit:  Inflammatory Bowel Disease (Submitted on 10/25/2023)  When you are not experiencing symptoms of your inflammatory bowel disease, how many bowel movements do you typically have each day?: 3  What is the average (typical) number of bowel movements that you had in a single day during the last week?: 3  Over the last 3 days, have you had any bowel movements where you passed blood without stool?: No  Since your last visit, have you received any vaccinations?: No  Since the last visit, have you had an infection?: No  Is there any possibility that you may be pregnant?: Yes  In the past three months, have you used tobacco in any form?: No  During the last year, how many days have you missed work or school because of your inflammatory bowel disease?: 0  During the last year, how many days have you been hospitalized because of your inflammatory bowel disease?: 0  During the last year, how many days have you visited a hospital emergency department because of your inflammatory bowel disease?: 0  During the last month, have you taken narcotic pain medications (such as Percocet, oxycodone, Oxycontin, morphine, Vicodin, Dilaudid, MS Contin) for your inflammatory bowel disease?: No  Have you awoken at night because you needed to move your bowels during the last month? : No  Have you had leakage of stool while sleeping during the last month?: No  Have you had leakage of stool while you were awake during the last month?: No  In the last 6 months, have you unintentionally lost weight?: No  Fever: Yes  Eye irritation: No  Mouth sores: Yes  Sore throat: No  Chest pain: No  Shortness of breath: No  Numbness or tingling in your hands or feet: Yes  Skin rash: No  Pain or swelling in your joints: Yes  Bruising or bleeding: No  Felt depressed or blue: No

## 2023-11-01 NOTE — PATIENT INSTRUCTIONS
As we discussed we will resume the omeprazole/Prilosec once daily ideally 20 to 30 minutes before a meal.  Hopefully this will alleviate the reflux and upper abdominal symptoms. If so, we will treat for 2 to 4 weeks then continue with Pepcid alone so we will continue    We will plan to follow through with the colonoscopy next week as scheduled.   Please contact me if you have a positive pregnancy test before then and we will treat based on clinical grounds

## 2023-11-12 DIAGNOSIS — K51.30 ULCERATIVE RECTOSIGMOIDITIS WITHOUT COMPLICATION (HCC): ICD-10-CM

## 2023-11-13 RX ORDER — MESALAMINE 1.2 G/1
TABLET, DELAYED RELEASE ORAL
Qty: 360 TABLET | Refills: 1 | Status: SHIPPED | OUTPATIENT
Start: 2023-11-13

## 2023-11-15 DIAGNOSIS — Z34.90 EARLY STAGE OF PREGNANCY: Primary | ICD-10-CM

## 2023-11-17 LAB — HCG INTACT+B SERPL-ACNC: 2353 MIU/ML

## 2023-11-24 ENCOUNTER — OFFICE VISIT (OUTPATIENT)
Dept: OBGYN CLINIC | Facility: CLINIC | Age: 38
End: 2023-11-24
Payer: COMMERCIAL

## 2023-11-24 ENCOUNTER — TELEPHONE (OUTPATIENT)
Dept: OBGYN CLINIC | Facility: CLINIC | Age: 38
End: 2023-11-24

## 2023-11-24 VITALS
WEIGHT: 106.2 LBS | BODY MASS INDEX: 18.13 KG/M2 | SYSTOLIC BLOOD PRESSURE: 104 MMHG | DIASTOLIC BLOOD PRESSURE: 62 MMHG | HEIGHT: 64 IN

## 2023-11-24 DIAGNOSIS — O26.899 PELVIC PAIN IN PREGNANCY: Primary | ICD-10-CM

## 2023-11-24 DIAGNOSIS — R10.2 PELVIC PAIN IN PREGNANCY: Primary | ICD-10-CM

## 2023-11-24 PROBLEM — F41.9 ANXIETY: Status: ACTIVE | Noted: 2023-11-24

## 2023-11-24 PROBLEM — K51.90 ULCERATIVE COLITIS (HCC): Status: ACTIVE | Noted: 2023-11-24

## 2023-11-24 PROCEDURE — 76817 TRANSVAGINAL US OBSTETRIC: CPT | Performed by: PHYSICIAN ASSISTANT

## 2023-11-24 PROCEDURE — 99213 OFFICE O/P EST LOW 20 MIN: CPT | Performed by: PHYSICIAN ASSISTANT

## 2023-11-24 NOTE — PROGRESS NOTES
Routine Prenatal Visit  [address] [place] [address], [address], [facility], [address] [facility],8W    Assessment/Plan:  Drew Ren is a 41y.o. year old  at Unknown who presents for routine prenatal visit. 1. Pelvic pain in pregnancy  Assessment & Plan:  Reassured intrauterine twin pregnancy of uncertain viability. Reassured very common to be difficult to see fetal pole and heart beat at this gestational age and not concerning. Return to office for schedule initial prenatal appointment in 2 weeks     Orders:  -     AMB US OB < 14 weeks single or first gestation level 1        Next OB Visit 2 weeks for scheduled initial prenatal.    Subjective:     CC: Prenatal care    Katherin Hsu is a 41 y.o.  female who presents for right sided pelvic pain in early pregnancy. LMP: 10/13/2023. 6w0d today by dates. Pregnancy ROS: No FM, N/V, HA, VB, LOF, edema, domestic violence, or smoking. Tolerating PNV. The following portions of the patient's history were reviewed and updated as appropriate: allergies, current medications, past family history, past medical history, obstetric history, gynecologic history, past social history, past surgical history and problem list.      Objective:  /62 (BP Location: Left arm, Patient Position: Sitting, Cuff Size: Standard)   Ht 5' 4" (1.626 m)   Wt 48.2 kg (106 lb 3.2 oz)   LMP 10/12/2023 (Approximate)   Breastfeeding No   BMI 18.23 kg/m²   Pregravid Weight/BMI: No episode found (BMI Could not be calculated)  Current Weight: 48.2 kg (106 lb 3.2 oz)   Total Weight Gain: Not found.    Pre- Vitals      Flowsheet Row Most Recent Value   Prenatal Assessment    Prenatal Vitals    Blood Pressure 104/62   Weight - Scale 48.2 kg (106 lb 3.2 oz)   Urine Albumin/Glucose    Dilation/Effacement/Station    Vaginal Drainage    Edema              General: Well appearing, no distress  Abdomen: Soft, gravid, nontender  Fundal Height: Appropriate for gestational age.  Extremities: Warm and well perfused. Non tender. FIRST TRIMESTER OBSTETRIC ULTRASOUND  Date of study: 11/24/2023  Performed by: Jimenez Palacios PA-C     INDICATION: right sided pelvic pain in pregnancy    COMPARISON: None. TECHNIQUE:   Transvaginal imaging was performed to assess the gestation, myometrial/endometrial architecture and ovarian parenchymal detail. The study includes volumetric sweeps and traditional still imaging technique. FINDINGS:     Two separate intrauterine gestational sacs identified. Yolk sac present in each, difficult to see fetal poles. Fetus A:   YOLK SAC:  Present and normal in size and appearance. GESTATIONAL SAC DIAMETER:  11.15 mm = 5 weeks 5 days   AMNIOTIC FLUID/SAC SHAPE:  Within expected normal range. Fetus B:   YOLK SAC:  Present and normal in size and appearance. GESTATIONAL SAC DIAMETER:  9.45 mm = 5 weeks 3 days   AMNIOTIC FLUID/SAC SHAPE:  Within expected normal range. UTERUS/ADNEXA:   No adnexal mass or pathologic cyst.  No free fluid identified. IMPRESSION:    Twin intrauterine pregnancy. Yolk sac in each gestational sac. Too small to clearly visualize fetal pole and assess FHR at this time. Will confirm dating with next ultrasound. LMP: 10/13/2023   Gestational age: 8w0d by LMP. No adnexal masses seen.     Jimenez Palacios PA-C  11/24/2023 11:17 AM

## 2023-11-24 NOTE — ASSESSMENT & PLAN NOTE
Reassured intrauterine twin pregnancy of uncertain viability. Reassured very common to be difficult to see fetal pole and heart beat at this gestational age and not concerning.    Return to office for schedule initial prenatal appointment in 2 weeks

## 2023-11-24 NOTE — TELEPHONE ENCOUNTER
Patient left message on my chart reporting right sided/pelvic/groin pain. Spoke with patient via phone and she reports having the pain constant and consistently favoring right side. She denies any bleeding or any severe, doubled-over groin/pelvic pain. Patient states that she is worried pregnancy could be ectopic. Patient advised to continue monitoring pain and call office back if bleeding occurs. Patient had previous HCG levels taken approx a week ago that are rising appropriately. Spoke with Dr. Keerthi Vincent, and patient is to come in to get an U/S. Spoke with patient and advised her to come in for u/s appointment. If she experiences any bleeding before appointment to give office a call back before then. Patient verbalized understanding and appreciated phone call. [address] desk notified. Ernestina, this is just an [place].

## 2023-12-08 ENCOUNTER — INITIAL PRENATAL (OUTPATIENT)
Dept: OBGYN CLINIC | Facility: CLINIC | Age: 38
End: 2023-12-08
Payer: COMMERCIAL

## 2023-12-08 ENCOUNTER — TELEPHONE (OUTPATIENT)
Facility: HOSPITAL | Age: 38
End: 2023-12-08

## 2023-12-08 ENCOUNTER — PATIENT OUTREACH (OUTPATIENT)
Dept: OBGYN CLINIC | Facility: CLINIC | Age: 38
End: 2023-12-08

## 2023-12-08 VITALS
DIASTOLIC BLOOD PRESSURE: 62 MMHG | BODY MASS INDEX: 17.42 KG/M2 | HEIGHT: 64 IN | SYSTOLIC BLOOD PRESSURE: 96 MMHG | WEIGHT: 102 LBS

## 2023-12-08 DIAGNOSIS — Z12.4 SCREENING FOR CERVICAL CANCER: Primary | ICD-10-CM

## 2023-12-08 DIAGNOSIS — O30.041 DICHORIONIC DIAMNIOTIC TWIN PREGNANCY IN FIRST TRIMESTER: ICD-10-CM

## 2023-12-08 DIAGNOSIS — Z36.89 ENCOUNTER FOR OTHER SPECIFIED ANTENATAL SCREENING: ICD-10-CM

## 2023-12-08 DIAGNOSIS — O99.340 DEPRESSION AFFECTING PREGNANCY: ICD-10-CM

## 2023-12-08 DIAGNOSIS — F32.A DEPRESSION AFFECTING PREGNANCY: ICD-10-CM

## 2023-12-08 DIAGNOSIS — Z34.91 PRENATAL CARE IN FIRST TRIMESTER: ICD-10-CM

## 2023-12-08 DIAGNOSIS — O21.9 NAUSEA AND VOMITING IN PREGNANCY: ICD-10-CM

## 2023-12-08 DIAGNOSIS — Z36.89 ENCOUNTER FOR OTHER SPECIFIED ANTENATAL SCREENING: Primary | ICD-10-CM

## 2023-12-08 DIAGNOSIS — O09.521 MULTIGRAVIDA OF ADVANCED MATERNAL AGE IN FIRST TRIMESTER: ICD-10-CM

## 2023-12-08 DIAGNOSIS — Z11.3 ROUTINE SCREENING FOR STI (SEXUALLY TRANSMITTED INFECTION): ICD-10-CM

## 2023-12-08 PROBLEM — O30.049 DICHORIONIC DIAMNIOTIC TWIN GESTATION: Status: ACTIVE | Noted: 2023-12-08

## 2023-12-08 PROBLEM — Z3A.08 8 WEEKS GESTATION OF PREGNANCY: Status: ACTIVE | Noted: 2023-12-08

## 2023-12-08 LAB
EXTERNAL CHLAMYDIA SCREEN: NEGATIVE
EXTERNAL GONORRHEA SCREEN: NEGATIVE
SL AMB  POCT GLUCOSE, UA: NEGATIVE
SL AMB POCT URINE PROTEIN: ABNORMAL

## 2023-12-08 PROCEDURE — 76817 TRANSVAGINAL US OBSTETRIC: CPT | Performed by: STUDENT IN AN ORGANIZED HEALTH CARE EDUCATION/TRAINING PROGRAM

## 2023-12-08 PROCEDURE — OBC: Performed by: STUDENT IN AN ORGANIZED HEALTH CARE EDUCATION/TRAINING PROGRAM

## 2023-12-08 PROCEDURE — 81002 URINALYSIS NONAUTO W/O SCOPE: CPT | Performed by: STUDENT IN AN ORGANIZED HEALTH CARE EDUCATION/TRAINING PROGRAM

## 2023-12-08 PROCEDURE — PNV: Performed by: STUDENT IN AN ORGANIZED HEALTH CARE EDUCATION/TRAINING PROGRAM

## 2023-12-08 RX ORDER — ONDANSETRON 8 MG/1
8 TABLET, ORALLY DISINTEGRATING ORAL EVERY 8 HOURS PRN
COMMUNITY

## 2023-12-08 RX ORDER — UREA 10 %
800 LOTION (ML) TOPICAL DAILY
Qty: 90 TABLET | Refills: 2 | Status: SHIPPED | OUTPATIENT
Start: 2023-12-08

## 2023-12-08 RX ORDER — PYRIDOXINE HCL (VITAMIN B6) 25 MG
25 TABLET ORAL 3 TIMES DAILY PRN
Qty: 90 TABLET | Refills: 1 | Status: SHIPPED | OUTPATIENT
Start: 2023-12-08

## 2023-12-08 RX ORDER — ASPIRIN 81 MG/1
162 TABLET, CHEWABLE ORAL DAILY
Qty: 180 TABLET | Refills: 2 | Status: SHIPPED | OUTPATIENT
Start: 2023-12-08

## 2023-12-08 NOTE — TELEPHONE ENCOUNTER
LVM w/office number regarding returning PT's call to schedule appt w/referral. Looked at [facility] location for twin nuchal and nothing available during timeframe. Temporarily put PT on schedule for 1/16 @ 12:45 pm in [place] to hold spot due to GA and twins. Gave appt details and waiting on PT to call back to confirm appt or r/s to another lcoation.

## 2023-12-08 NOTE — PROGRESS NOTES
OB INTAKE INTERVIEW  Patient is 41 y. o.who presents for OB intake at 8.1wks  She is accompanied by:    The father of her baby involved in the pregnancy.   Last Menstrual Period: 10/123/23  Ultrasound: Measured 8 weeks on 23  Estimated Date of Delivery: 24 confirmed by ultrasound. Signs/Symptoms of Pregnancy  Current pregnancy symptoms: nausea, vomiting, breast tenderness   Constipation YES  Headaches no  Cramping/spotting YES, mild cramping in the beginning   PICA cravings no    Diabetes-  Body mass index is 17.51 kg/m². If patient has 1 or more, please order early 1 hour GTT  History of GDM no  BMI >30 no  History of PCOS or current metformin use no  History of LGA/macrosomic infant (4000g/9lbs) no    If patient has 2 or more, please order early 1 hour GTT  AMA YES  First degree relative with type 2 diabetes no  History of chronic HTN, hyperlipidemia, elevated A1C no  High risk race (, , ,  or ) no    Hypertension- if you answer yes, please order preeclampsia labs (cbc, comprehensive metabolic panel, urine protein creatinine ratio, uric acid)  History of of chronic HTN no  History of gestational HTN no  History of preeclampsia, eclampsia, or HELLP syndrome no  History of diabetes no  History of lupus, autoimmune disease, kidney disease, antiphospholipid syndrome, rheumatoid arthritis, sjogren's syndrome YES, ulcerative colitis     Thyroid- if yes order TSH with reflex T4 (Unless TSH value on file within last 4 weeks then do not need to order)  History of thyroid disease no    Bleeding Disorder or Hx of DVT-patient or first degree relative with history of. Order the following if not done previously.    (Factor V, antithrombin III, prothrombin gene mutation, protein C and S Ag, lupus anticoagulant, anticardiolipin, beta-2 glycoprotein)   no    OB/GYN-  History of abnormal pap smear YES  History of HPV no  History of Herpes/HSV no  History of other STI (gonorrhea, chlamydia, trich) (or current partner with Hep B, Hep C, or HIV) no  History of prior  YES  History of prior  no  History of  delivery prior to 36 weeks 6 days no  History of blood transfusion no  Ok for blood transfusion YES    Substance screening-   History of tobacco use no  Currently using tobacco no  Currently using alcohol no  Presently using drugs no  Past drug use (if yes, order urine drug screening panel)  no  IV drug use (if yes, order urine drug screening panel) no  Partner drug use (if yes, order urine drug screening panel) no  Parent/Family drug use (do not order urine drug screening panel just for family hx) no    Depression Screening-  PHQ-9 Score: (6)    MRSA Screening-   Does the pt have a hx of MRSA? no  If yes- please follow MRSA protocol and obtain a nasal swab for MRSA culture at 12 week visit. Immunizations:  Influenza vaccine given this season (ask date) YES  Discussed Tdap vaccine (ask date) YES  Discussed COVID Vaccine (request pt upload card or bring to next visit) Yes, patient has declined it for now. Genetic/MFM-  Do you or your partner have a history of any of the following in yourselves or first degree relatives? Cystic fibrosis no  Spinal muscular atrophy no  Hemoglobinopathy/Sickle Cell/Thalassemia no  Fragile X Intellectual Disability no    If yes, discuss carrier screening and recommend consultation with [facility]/genetic counseling. If no, discuss option for carrier screening and/or genetic testing with Nuchal Ultrasound. Patient interested NO, not at this time. Appointment at [facility] made No, patient will call to schedule appointment.      Interview education  [facility] Pregnancy Essentials Book reviewed and discussed YES      Prenatal lab work scripts YES    Extra labs ordered: none       The patient has a history now or in prior pregnancy notable for:ulcerative colitis, GERD, IBS, hx of C-diff, anxiety-taking 50 mg of zoloft managed by her PCP, LEEP procedure in , received iron transfusion in 2023-sees hematologist Dr. Melina Solomon from [facility], follow GI Dr. Liana Morgan, hyperemesis , U/S done on 23 twin pregnancy +yolk sac in both gestational sacs, history of hyperemesis gravidarum        Details that I feel the provider should be aware of:  ulcerative colitis, GERD, IBS, hx of C-diff, anxiety-taking 50 mg of zoloft managed by her PCP, LEEP procedure in , received iron transfusion in 2023-sees hematologist Dr. Melina Solomon from [facility], follow GI Dr. Liana Morgan, hyperemesis , U/S done on 23 twin pregnancy +yolk sac in both gestational sacs, taking Zoloft 50 mg, zofran PRN, Liadal     The patient was oriented to our practice, reviewed delivering physicians and [address] in [place] for Delivery. All questions were answered.     Interviewed by: Soco Rudolph

## 2023-12-08 NOTE — PATIENT INSTRUCTIONS
Congratulations!! Please review our Pregnancy Essential Guide for informative education and here is a link to take a [facility] tour of our [address].      [facility] P[facility] Essentials Guide  [facility] ([email or web address])       [facility] and [address] [place] on [address] [facility]

## 2023-12-08 NOTE — PROGRESS NOTES
SW referred for initial prenatal assessment. Patient is Nohemi Jordan with an ELINOR of 7[birth year]. She is having twins. Patient agreeable to meeting with SW today. Patient reports she and FOB ( Jose Pringle) are excited for pregnancy. They currently live in [place] with their three children (10 y/o, 3 y/o, and 3 y/o). 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 She denies current financial concerns or need for parenting education or baby supply resources. She did have MA previously due to one of her daughters needing speech, but it was recently cut off. Patient unsure if they still qualify, but expressed interest in MA/WIC/SNAP information - SW sent via [employer]. Patient reports h/o anxiety - states she is managed on 50mg Zoloft through her PCP. Denies interest in therapy at this time. Denies h/o PPD or PPA. Has felt okay this pregnancy besides nausea. Has good support from FOB and large family. For stress relief she enjoys running, working out, and spending an hour in the morning to herself before the kids wake up. Patient denies current or h/o substance use, DV/IPV, CYS involvement, and legal concerns. No other SW needs identified at this time, SW closing referral. Please re-refer as needed.

## 2023-12-08 NOTE — ASSESSMENT & PLAN NOTE
- Recommend initiation of  mg PO daily from 12-36 weeks for pre-eclampsia risk reduction in the setting of AMA and twin pregnancy.

## 2023-12-08 NOTE — PROGRESS NOTES
[address] [place] [address], [address], [facility], [address] [facility],8W    Assessment/Plan:  Etta Cavazos is a 41y.o. year old who presents for evaluation of amenorrhea. Amenorrhea  - Live dichorionic diamniotic twin IUP identified on US today. Estimated Date of Delivery: 24 based on LMP. - Recommend prenatal vitamin with folic acid  - Ultrasound completed, please see Chart Review - Ob Procedures, Ultrasound Report for Interpretation.  - Nursing notes from OB intake reviewed. - Aneuploidy screening discussed. Referred to [facility] for NT and genetic counseling. .  - Routine cervical cancer screening: Pap Done today. - Routine STI Screening: GC/Chlamydia sent today. HIV/Hep B/Syphilis ordered in prenatal panel.  - Patient Education: Patient was counseled regarding diet, exercise, weight gain, foods to avoid, vaccines in pregnancy, aneuploidy screening, travel precautions to include seat belt use and VTE risk reduction. She has been provided our pregnancy packet which includes how and when to contact providers, medication recommendations, dietary suggestions, breastfeeding information as well as websites for additional information, hospital and delivery concerns. Additional Pregnancy Problems:   1. Screening for cervical cancer  -     IGP,CtNg,AptimaHPV,rfx16/18,45    2. Routine screening for STI (sexually transmitted infection)  -     IGP,CtNg,AptimaHPV,rfx16/18,45    3. Encounter for other specified  screening  -     IGP,CtNg,AptimaHPV,rfx16/18,45    4. Nausea and vomiting in pregnancy  Assessment & Plan:  - Hx hyperemesis gravidarum with 3rd pregnancy. Required oral steroid taper.   - Zofran works best for her. Reglan and phenergan did not work in prior pregnancy. - Recommend starting pyridoxine/doxylamine.   - Discussed option of replacing prenatal vitamin with folic acid supplement alone. Rx provided. Orders:  -     pyridoxine (B-6) 25 MG tablet;  Take 1 tablet (25 mg total) by mouth 3 (three) times a day as needed (Nausea)  -     doxylamine (UNISOM) 25 MG tablet; Take 1 tablet (25 mg total) by mouth daily at bedtime as needed for sleep or nausea    5. Multigravida of advanced maternal age in first trimester  -     aspirin 81 mg chewable tablet; Chew 2 tablets (162 mg total) daily Starting at 12 weeks and stopping at 36 weeks. 10. Dichorionic diamniotic twin pregnancy in first trimester  -     aspirin 81 mg chewable tablet; Chew 2 tablets (162 mg total) daily Starting at 12 weeks and stopping at 36 weeks. -     folic acid (FOLVITE) 169 MCG tablet; Take 1 tablet (800 mcg total) by mouth daily  -     AMB US OB < 14 weeks single or first gestation level 1    7. Depression affecting pregnancy        Subjective:   CC:  Amenorrhea  Ezekiel Briggs is a 41 y.o.  female who presents for amenorrhea, now with confirmed viable pregnancy. Pregnancy ROS: No leakage of fluid, pelvic pain, or vaginal bleeding. Present nausea/vomiting.         Past Medical History:   Diagnosis Date    Anemia     Anxiety     managed by PCP    C. difficile colitis 2016    during pregnancy    Irritable bowel syndrome     LGSIL on Pap smear of cervix     had leep 2016    Ulcerative colitis ([address])     Dr. Rae Cerna     Past Surgical History:   Procedure Laterality Date    CERVICAL BIOPSY  W/ LOOP ELECTRODE EXCISION  2016    COLONOSCOPY      COLONOSCOPY  2022    mild pancolitis    WISDOM TOOTH EXTRACTION       Family History   Problem Relation Age of Onset    Heart disease Father     Hypertension Father     Thyroid disease Father     Anemia Sister     Depression Sister     Inflammatory bowel disease Sister     Breast cancer Sister     Irritable bowel syndrome Sister     Ulcerative colitis Sister     No Known Problems Sister     Breast cancer Sister 43    No Known Problems Sister     Colon polyps Brother     Depression Brother     No Known Problems Brother     No Known Problems Daughter     No Known Problems Son Colon cancer Neg Hx     Ovarian cancer Neg Hx     Uterine cancer Neg Hx     Prostate cancer Neg Hx     Pancreatic cancer Neg Hx      Social History     Socioeconomic History    Marital status: /Civil Union     Spouse name: Not on file    Number of children: 2    Years of education: some college    Highest education level: Not on file   Occupational History    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   Tobacco Use    Smoking status: Never     Passive exposure: Never    Smokeless tobacco: Never   Vaping Use    Vaping Use: Never used   Substance and Sexual Activity    Alcohol use: Never    Drug use: Never     Comment: No use    Sexual activity: Yes     Partners: Male     Birth control/protection: None   Other Topics Concern    Not on file   Social History Narrative    Exercises weekly     Social Determinants of Health     Financial Resource Strain: Not on file   Food Insecurity: Not on file   Transportation Needs: Not on file   Physical Activity: Not on file   Stress: Not on file   Social Connections: Not on file   Intimate Partner Violence: Not on file   Housing Stability: Not on file     Outpatient Medications Marked as Taking for the 12/8/23 encounter (Initial Prenatal) with Jeremy Villanueva MD   Medication    aspirin 81 mg chewable tablet    doxylamine (UNISOM) 25 MG tablet    folic acid (FOLVITE) 823 MCG tablet    pyridoxine (B-6) 25 MG tablet     Allergies   Allergen Reactions    Amoxicillin Other (See Comments), Diarrhea, GI Bleeding and GI Intolerance     C-diff, per pt  C-diff, per pt    Ibuprofen Other (See Comments)     Ulcerative colitis, per pt  Cannot take ibuprofen due to ulcerative colitis     FIRST TRIMESTER OBSTETRIC ULTRASOUND  Date of study: 12/8/2023  Performed by: Anne Alarcon MD     INDICATION: Dating and viability    COMPARISON: None.      TECHNIQUE:   Transvaginal imaging was performed to assess the gestation, myometrial/endometrial architecture and ovarian parenchymal detail. The study includes volumetric sweeps and traditional still imaging technique. FINDINGS:     A dichorionic diamniotic twin gestation is identified. Fetus A: Cardiac activity is detected at 162 bpm.   Fetus B: Cardiac activity is detected at 172 bpm.     Fetus A:   YOLK SAC:  Present and normal in size and appearance. MEAN CROWN RUMP LENGTH:  14.5 mm = 7 weeks 5 days   AMNIOTIC FLUID/SAC SHAPE:  Within expected normal range. Fetus B:   YOLK SAC:  Present and normal in size and appearance. MEAN CROWN RUMP LENGTH:  14.5 mm = 7 weeks 5 days   AMNIOTIC FLUID/SAC SHAPE:  Within expected normal range. UTERUS/ADNEXA:   No adnexal mass or pathologic cyst.  No free fluid identified. IMPRESSION:    Patient's last menstrual period was 10/13/2023 (exact date). Gestational age based on LMP: 11w0d  Gestational age based on US: 6w6d    Will assign dating based on LMP  Final Gestational age: 11w0d  Final Estimated Date of Delivery: 24   Fetal cardiac activity detected. No adnexal masses seen. Pam Macdonald MD  2023 9:18 AM        Objective:     LMP 10/13/2023 (Exact Date)   Pregravid Weight/BMI: 46.3 kg (102 lb) (BMI 17.50)  Current Weight:     Total Weight Gain: 0 kg (0 lb)   Pre-Renny Vitals      Flowsheet Row Most Recent Value   Prenatal Assessment    Fetal Heart Rate 162/172   Prenatal Vitals    Urine Albumin/Glucose    Dilation/Effacement/Station    Cervical Dilation 0   Cervical Effacement 0   Vaginal Drainage    Draining Fluid No   Edema    LLE Edema None   RLE Edema None   Facial Edema None             Chaperone present? Yes: Darek Martins RN. General Appearance: alert and oriented, in no acute distress. Neck/Thyroid: No thyromegaly, no thyroid nodules. Respiratory: Unlabored breathing. Cardiovascular: Regular rate, no peripheral edema. Abdomen: Soft, non-tender, non-distended, no masses, no rebound or guarding. Breast Exam: No dimpling, nipple retraction or discharge. No lumps or masses. No axillary or supraclavicular nodes. Pelvic:       External genitalia: Normal appearance, no abnormal pigmentation, no lesions or masses. Normal Bartholin's and Mangham's. Urinary system: Urethral meatus normal, bladder non-tender. Vaginal: normal mucosa without prolapse or lesions. Normal-appearing physiologic discharge. Cervix: Normal-appearing, well-epithelialized, no gross lesions or masses. No cervical motion tenderness. Adnexa: No adnexal masses or tenderness noted. Uterus: Approximately  week-sized, regular contour, midline, mobile, no uterine tenderness. Extremities: Normal range of motion. Warm, well-perfused, non-tender.    Skin: normal, no rash or abnormalities  Neurologic: alert, oriented x3  Psychiatric: Appropriate affect, mood stable, cooperative with exam.        Abelino Atkins MD  12/8/2023 2:16 PM

## 2023-12-08 NOTE — ASSESSMENT & PLAN NOTE
- Hx hyperemesis gravidarum with 3rd pregnancy. Required oral steroid taper.   - Zofran works best for her. Reglan and phenergan did not work in prior pregnancy. - Recommend starting pyridoxine/doxylamine.   - Discussed option of replacing prenatal vitamin with folic acid supplement alone. Rx provided.

## 2023-12-11 ENCOUNTER — OFFICE VISIT (OUTPATIENT)
Dept: GASTROENTEROLOGY | Facility: CLINIC | Age: 38
End: 2023-12-11
Payer: COMMERCIAL

## 2023-12-11 ENCOUNTER — PATIENT MESSAGE (OUTPATIENT)
Dept: OBGYN CLINIC | Facility: CLINIC | Age: 38
End: 2023-12-11

## 2023-12-11 ENCOUNTER — TELEPHONE (OUTPATIENT)
Facility: HOSPITAL | Age: 38
End: 2023-12-11

## 2023-12-11 VITALS
DIASTOLIC BLOOD PRESSURE: 68 MMHG | BODY MASS INDEX: 17.42 KG/M2 | SYSTOLIC BLOOD PRESSURE: 106 MMHG | HEIGHT: 64 IN | WEIGHT: 102 LBS

## 2023-12-11 DIAGNOSIS — K29.70 GASTRITIS DETERMINED BY ENDOSCOPY: Primary | ICD-10-CM

## 2023-12-11 DIAGNOSIS — O21.9 NAUSEA AND VOMITING IN PREGNANCY: Primary | ICD-10-CM

## 2023-12-11 DIAGNOSIS — K51.00 ULCERATIVE PANCOLITIS WITHOUT COMPLICATION (HCC): ICD-10-CM

## 2023-12-11 PROCEDURE — 99213 OFFICE O/P EST LOW 20 MIN: CPT | Performed by: INTERNAL MEDICINE

## 2023-12-11 RX ORDER — PROMETHAZINE HYDROCHLORIDE 25 MG/1
25 SUPPOSITORY RECTAL EVERY 6 HOURS PRN
Qty: 30 EACH | Refills: 1 | Status: SHIPPED | OUTPATIENT
Start: 2023-12-11

## 2023-12-11 RX ORDER — OMEPRAZOLE 20 MG/1
40 CAPSULE, DELAYED RELEASE ORAL DAILY
Qty: 30 CAPSULE | Refills: 1 | Status: SHIPPED | OUTPATIENT
Start: 2023-12-11

## 2023-12-11 RX ORDER — METOCLOPRAMIDE 10 MG/1
10 TABLET ORAL 4 TIMES DAILY
Qty: 120 TABLET | Refills: 1 | Status: SHIPPED | OUTPATIENT
Start: 2023-12-11

## 2023-12-11 NOTE — PROGRESS NOTES
[facility] - Outpatient Follow-up Note  Alli Chris 41 y.o. female MRN: [id number]  Encounter: [id number]    ASSESSMENT AND PLAN:      1. Ulcerative pancolitis without complication ([address])  Diagnosed 2013 and treated with mesalamine. Also had a flare-up in 2014 associated with C diff. off medications for years before a flare-up in July/August 2022. Treated with prednisone, colonoscopy September 2022 showed mild pancolitis, ileal biopsy showed active inflammation but no signs of chronicity. Small bowel capsule negative for small bowel inflammation     She had a viral colitis December 2022, then C. difficile in August 2023. Inflammatory markers normal.  Urgent stools persisted and a colonoscopy was planned in December, but he is delayed due to pregnancy. Fortunately, GI symptoms are well-controlled. We will continue mesalamine. She is actually experienced some constipation and I recommended adding a daily fiber supplement like Benefiber    2. Gastritis determined by endoscopy  3. N/v  4.  Pregnant with twins  Flareup of epigastric discomfort and nausea improved with a 2-week course of omeprazole prior to pregnancy  She is now experiencing hyperemesis with up to 10 episodes of vomiting daily spite Zofran every 8 hours. Minimal change with B6/hydroxyzine but dosing limited by drowsiness   I recommended a 2-week course of low-dose PPI. She will contact me if symptoms fail to improve     She reports OB discussed low-dose aspirin in the second trimester, and contemplated adding a steroid if she does not gain weight. No GI contraindication to these medications. Followup Appointment: 2-3 months  ______________________________________________________________________    Chief Complaint   Patient presents with    Follow up-ulcerative colitis     HPI: The patient presents for follow-up to ulcerative colitis and gastritis, and with a new complaint of hyperemesis.   She was last seen in the office November 1. We have had multiple portal encounters since then. She is 8 weeks and 3 days pregnant. Despite Zofran every 8 hours she is vomiting 10 times a day. She had similar symptoms with other pregnancies. She recently started B6 and Unisom but the Jenniffer Chante makes her fatigued. She is not taking any acid suppression. She denies any NSAIDs. She is eating bland food like crackers, Ramen and dry cereal.  Lower GI symptoms are well-controlled. There is no diarrhea and she actually feels constipated. She has a bowel movement approximately every other day. There is no blood or mucus.     Historical Information   Past Medical History:   Diagnosis Date    Anemia     Anxiety     managed by PCP    C. difficile colitis 2016    during pregnancy    Irritable bowel syndrome     LGSIL on Pap smear of cervix     had leep 2016    Ulcerative colitis ([address])     Dr. Ruba Fung     Past Surgical History:   Procedure Laterality Date    CERVICAL BIOPSY  W/ LOOP ELECTRODE EXCISION  2016    COLONOSCOPY      COLONOSCOPY  09/06/2022    mild pancolitis    WISDOM TOOTH EXTRACTION  2000     Social History     Substance and Sexual Activity   Alcohol Use Never     Social History     Substance and Sexual Activity   Drug Use Never    Comment: No use     Social History     Tobacco Use   Smoking Status Never    Passive exposure: Never   Smokeless Tobacco Never     Family History   Problem Relation Age of Onset    Heart disease Father     Hypertension Father     Thyroid disease Father     Anemia Sister     Depression Sister     Inflammatory bowel disease Sister     Breast cancer Sister     Irritable bowel syndrome Sister     Ulcerative colitis Sister     No Known Problems Sister     Breast cancer Sister 43    No Known Problems Sister     Colon polyps Brother     Depression Brother     No Known Problems Brother     No Known Problems Daughter     No Known Problems Son     Colon cancer Neg Hx     Ovarian cancer Neg Hx Uterine cancer Neg Hx     Prostate cancer Neg Hx     Pancreatic cancer Neg Hx          Current Outpatient Medications:     aspirin 81 mg chewable tablet    doxylamine (UNISOM) 25 MG tablet    folic acid (FOLVITE) 375 MCG tablet    mesalamine (LIALDA) 1.2 g EC tablet    omeprazole (PriLOSEC) 20 mg delayed release capsule    ondansetron (ZOFRAN-ODT) 8 mg disintegrating tablet    Prenatal Vit-Fe Fumarate-FA (PRENATAL VITAMIN PO)    pyridoxine (B-6) 25 MG tablet    sertraline (ZOLOFT) 100 mg tablet    traZODone (DESYREL) 100 mg tablet  Allergies   Allergen Reactions    Amoxicillin Other (See Comments), Diarrhea, GI Bleeding and GI Intolerance     C-diff, per pt  C-diff, per pt    Ibuprofen Other (See Comments)     Ulcerative colitis, per pt  Cannot take ibuprofen due to ulcerative colitis     Reviewed medications and allergies and updated as indicated    PHYSICAL EXAM:    Blood pressure 106/68, height 5' 4" (1.626 m), weight 46.3 kg (102 lb), last menstrual period 10/13/2023, not currently breastfeeding. Body mass index is 17.51 kg/m². General Appearance: NAD, cooperative, alert  Eyes: Anicteric, PERRLA, EOMI  ENT:  Normocephalic, atraumatic, normal mucosa. Neck:  Supple, symmetrical, trachea midline  Resp:  Clear to auscultation bilaterally; no rales, rhonchi or wheezing; respirations unlabored   CV:  S1 S2, Regular rate and rhythm; no murmur, rub, or gallop. GI:  Soft, non-tender, non-distended; normal bowel sounds; no masses, no organomegaly   Rectal: Deferred  Musculoskeletal: No cyanosis, clubbing or edema. Normal ROM.   Skin:  No jaundice, rashes, or lesions   Heme/Lymph: No palpable cervical lymphadenopathy  Psych: Normal affect, good eye contact  Neuro: No gross deficits, AAOx3    Lab Results:   Lab Results   Component Value Date    WBC 4.6 06/06/2023    HGB 14.1 06/06/2023    HCT 43.3 06/06/2023    MCV 89 06/06/2023     06/06/2023     Lab Results   Component Value Date    K 4.3 06/06/2023     06/06/2023    CO2 23 06/06/2023    BUN 11 06/06/2023    CREATININE 0.87 06/06/2023    AST 26 06/06/2023    ALT 21 06/06/2023    EGFR 88 06/06/2023     No results found for: "IRON", "TIBC", "FERRITIN"  No results found for: "LIPASE"    Radiology Results:   AMB US OB < 14 weeks single or first gestation level 1    Result Date: 12/8/2023  Narrative: FIRST TRIMESTER OBSTETRIC ULTRASOUND Date of study: 12/8/2023 Performed by: Ronald Damian MD  INDICATION: Dating and viability COMPARISON: None. TECHNIQUE:   Transvaginal imaging was performed to assess the gestation, myometrial/endometrial architecture and ovarian parenchymal detail. The study includes volumetric sweeps and traditional still imaging technique. FINDINGS:  A dichorionic diamniotic twin gestation is identified. Fetus A: Cardiac activity is detected at 162 bpm. Fetus B: Cardiac activity is detected at 172 bpm. Fetus A: YOLK SAC:  Present and normal in size and appearance. MEAN CROWN RUMP LENGTH:  14.5 mm = 7 weeks 5 days AMNIOTIC FLUID/SAC SHAPE:  Within expected normal range. Fetus B: YOLK SAC:  Present and normal in size and appearance. MEAN CROWN RUMP LENGTH:  14.5 mm = 7 weeks 5 days AMNIOTIC FLUID/SAC SHAPE:  Within expected normal range. UTERUS/ADNEXA: No adnexal mass or pathologic cyst. No free fluid identified. Impression: Patient's last menstrual period was 10/13/2023 (exact date). Gestational age based on LMP: 11w0d Gestational age based on US: 6w6d Will assign dating based on LMP Final Gestational age: 11w0d Final Estimated Date of Delivery: 7/19/24 Fetal cardiac activity detected. No adnexal masses seen. Ronald Damian MD 12/8/2023 9:18 AM      AMB US OB < 14 weeks single or first gestation level 1    Result Date: 11/24/2023  Narrative: FIRST TRIMESTER OBSTETRIC ULTRASOUND Date of study: 11/24/2023 Performed by: Jimenez Palacios PA-C  INDICATION: right sided pelvic pain in pregnancy COMPARISON: None.   TECHNIQUE:   Transvaginal imaging was performed to assess the gestation, myometrial/endometrial architecture and ovarian parenchymal detail. The study includes volumetric sweeps and traditional still imaging technique. FINDINGS:  Two separate intrauterine gestational sacs identified. Yolk sac present in each, difficult to see fetal poles. Fetus A: YOLK SAC:  Present and normal in size and appearance. GESTATIONAL SAC DIAMETER:  11.15 mm = 5 weeks 5 days AMNIOTIC FLUID/SAC SHAPE:  Within expected normal range. Fetus B: YOLK SAC:  Present and normal in size and appearance. GESTATIONAL SAC DIAMETER:  9.45 mm = 5 weeks 3 days AMNIOTIC FLUID/SAC SHAPE:  Within expected normal range. UTERUS/ADNEXA: No adnexal mass or pathologic cyst. No free fluid identified. Impression: Twin intrauterine pregnancy. Yolk sac in each gestational sac. Too small to clearly visualize fetal pole and assess FHR at this time. Will confirm dating with next ultrasound. LMP: 10/13/2023 Gestational age: 8w0d by LMP. No adnexal masses seen.  Sherryle Pacini, PA-C 11/24/2023 11:01 AMAnswers submitted by the patient for this visit:  Inflammatory Bowel Disease (Submitted on 12/4/2023)  When you are not experiencing symptoms of your inflammatory bowel disease, how many bowel movements do you typically have each day?: 3  What is the average (typical) number of bowel movements that you had in a single day during the last week?: 1  Over the last 3 days, have you had any bowel movements where you passed blood without stool?: No  Since your last visit, have you received any vaccinations?: No  Since the last visit, have you had an infection?: No  Is there any possibility that you may be pregnant?: Yes  In the past three months, have you used tobacco in any form?: No  During the last year, how many days have you missed work or school because of your inflammatory bowel disease?: 0  During the last year, how many days have you been hospitalized because of your inflammatory bowel disease?: 0  During the last year, how many days have you visited a hospital emergency department because of your inflammatory bowel disease?: 0  During the last month, have you taken narcotic pain medications (such as Percocet, oxycodone, Oxycontin, morphine, Vicodin, Dilaudid, MS Contin) for your inflammatory bowel disease?: No  Have you awoken at night because you needed to move your bowels during the last month? : No  Have you had leakage of stool while sleeping during the last month?: No  Have you had leakage of stool while you were awake during the last month?: No  In the last 6 months, have you unintentionally lost weight?: No  Fever: No  Eye irritation: No  Mouth sores: No  Sore throat: No  Chest pain: No  Shortness of breath: No  Numbness or tingling in your hands or feet: No  Skin rash: No  Pain or swelling in your joints: No  Bruising or bleeding: No  Felt depressed or blue: No

## 2023-12-11 NOTE — PATIENT COMMUNICATION
Spoke with Marck Ruff about Dr. Murrieta Bound recommendations as to Phenergan supp, Reglan & Zofran. Informed her of Dr. Murrieta Bound message as to steroids. Acharya Brookdale was seen by her GI today, & prescribed Omeprazole too. Marck Khanro said the Reglan in the past caused her headaches and other side effects. She is willing to do Phenergan supp but may not start Reglan. She will contact her GI re: Phenergan supp. Viviana Medina

## 2023-12-11 NOTE — TELEPHONE ENCOUNTER
I'd like her to give Phenergan suppositories another try along with Reglan PO. I know she tried Reglan last pregnancy without much relief, but I'm hoping that if we get her on a standing regimen of Zofran and Reglan with Phenergan suppositories PRN, we might be able to but some time before resorting to steroid taper. I'll sent Rx now.      .timee

## 2023-12-13 DIAGNOSIS — O21.9 NAUSEA AND VOMITING IN PREGNANCY: ICD-10-CM

## 2023-12-13 LAB
C TRACH RRNA CVX QL NAA+PROBE: NEGATIVE
CYTOLOGIST CVX/VAG CYTO: NORMAL
DX ICD CODE: NORMAL
HPV GENOTYPE REFLEX: NORMAL
HPV I/H RISK 4 DNA CVX QL PROBE+SIG AMP: NEGATIVE
Lab: NORMAL
N GONORRHOEA RRNA CVX QL NAA+PROBE: NEGATIVE
OTHER STN SPEC: NORMAL
PATH REPORT.FINAL DX SPEC: NORMAL
SL AMB NOTE:: NORMAL
SL AMB SPECIMEN ADEQUACY: NORMAL
SL AMB TEST METHODOLOGY: NORMAL

## 2023-12-13 RX ORDER — PYRIDOXINE HCL (VITAMIN B6) 25 MG
25 TABLET ORAL 3 TIMES DAILY PRN
Qty: 90 TABLET | Refills: 1 | Status: SHIPPED | OUTPATIENT
Start: 2023-12-13

## 2023-12-26 DIAGNOSIS — K29.70 GASTRITIS DETERMINED BY ENDOSCOPY: ICD-10-CM

## 2023-12-27 ENCOUNTER — TELEPHONE (OUTPATIENT)
Dept: OBGYN CLINIC | Facility: CLINIC | Age: 38
End: 2023-12-27

## 2023-12-27 LAB
EXTERNAL ANTIBODY SCREEN: NORMAL
EXTERNAL HEMOGLOBIN: 12.2 G/DL
EXTERNAL HEPATITIS B SURFACE ANTIGEN: NEGATIVE
EXTERNAL HIV-1/2 AB-AG: NORMAL
EXTERNAL PLATELET COUNT: 182 K/ÂΜL
EXTERNAL RH FACTOR: POSITIVE
EXTERNAL RUBELLA IGG QUANTITATION: NORMAL

## 2023-12-27 RX ORDER — OMEPRAZOLE 20 MG/1
40 CAPSULE, DELAYED RELEASE ORAL DAILY
Qty: 180 CAPSULE | Refills: 1 | Status: SHIPPED | OUTPATIENT
Start: 2023-12-27

## 2023-12-27 NOTE — TELEPHONE ENCOUNTER
Contreras called reception requesting sooner appt to discuss hyperemesis. Return call to Contreras,  Zofran 2-3 times daily, Unisom at bedtime, B6 TID. GI ordered omeprazole 20mg once daily.  Did not want to use phenergan. She does not feel she is dehydrated- drinking gatorade 0 and water. Typically vomiting 3 times per day, down from 15 times per day.  Tolerating peanut butter pretzel, corn flakes, mashed potatoes. Recommended protein shakes/protein yogurt-can freeze and eat slowly like ice cream, eggs-has been tolerating hard boiled eggs and nuts.  She is in agreement to try recommendations provided to help with maintaining current weight until appetite improves.  WCB if vomiting increases, or unable to continue with increased fluid intake.  Recommended discuss concerns of weight loss with provider at next OB visit on 1/10/2023. Advised will forward to on call provider to review and provide any additional recommendaitons at this time.

## 2023-12-28 NOTE — TELEPHONE ENCOUNTER
As she is able to take in nutrition and is not dehydrated I would not change anything at this time. The N/V is slowly improving and there should be additional improvement over the next month

## 2023-12-29 LAB
ABO GROUP BLD: ABNORMAL
APPEARANCE UR: ABNORMAL
BACTERIA UR CULT: ABNORMAL
BACTERIA UR CULT: NO GROWTH
BACTERIA URNS QL MICRO: ABNORMAL
BASOPHILS # BLD AUTO: 0 X10E3/UL (ref 0–0.2)
BASOPHILS NFR BLD AUTO: 1 %
BILIRUB UR QL STRIP: NEGATIVE
BLD GP AB SCN SERPL QL: NEGATIVE
C TRACH RRNA SPEC QL NAA+PROBE: NEGATIVE
CASTS URNS QL MICRO: ABNORMAL /LPF
COLOR UR: YELLOW
EOSINOPHIL # BLD AUTO: 0.2 X10E3/UL (ref 0–0.4)
EOSINOPHIL NFR BLD AUTO: 3 %
EPI CELLS #/AREA URNS HPF: ABNORMAL /HPF (ref 0–10)
ERYTHROCYTE [DISTWIDTH] IN BLOOD BY AUTOMATED COUNT: 13 % (ref 11.7–15.4)
GLUCOSE UR QL: NEGATIVE
HBV SURFACE AG SERPL QL IA: NEGATIVE
HCT VFR BLD AUTO: 35.8 % (ref 34–46.6)
HCV AB S/CO SERPL IA: NON REACTIVE
HGB BLD-MCNC: 12.2 G/DL (ref 11.1–15.9)
HGB UR QL STRIP: ABNORMAL
HIV 1+2 AB+HIV1 P24 AG SERPL QL IA: NON REACTIVE
IMM GRANULOCYTES # BLD: 0 X10E3/UL (ref 0–0.1)
IMM GRANULOCYTES NFR BLD: 0 %
KETONES UR QL STRIP: NEGATIVE
LEUKOCYTE ESTERASE UR QL STRIP: ABNORMAL
LYMPHOCYTES # BLD AUTO: 0.9 X10E3/UL (ref 0.7–3.1)
LYMPHOCYTES NFR BLD AUTO: 16 %
MCH RBC QN AUTO: 30.7 PG (ref 26.6–33)
MCHC RBC AUTO-ENTMCNC: 34.1 G/DL (ref 31.5–35.7)
MCV RBC AUTO: 90 FL (ref 79–97)
MICRO URNS: ABNORMAL
MONOCYTES # BLD AUTO: 0.4 X10E3/UL (ref 0.1–0.9)
MONOCYTES NFR BLD AUTO: 7 %
N GONORRHOEA RRNA SPEC QL NAA+PROBE: NEGATIVE
NEUTROPHILS # BLD AUTO: 4.1 X10E3/UL (ref 1.4–7)
NEUTROPHILS NFR BLD AUTO: 73 %
NITRITE UR QL STRIP: NEGATIVE
PH UR STRIP: 7.5 [PH] (ref 5–7.5)
PLATELET # BLD AUTO: 182 X10E3/UL (ref 150–450)
PROT UR QL STRIP: NEGATIVE
RBC # BLD AUTO: 3.97 X10E6/UL (ref 3.77–5.28)
RBC #/AREA URNS HPF: ABNORMAL /HPF (ref 0–2)
RH BLD: POSITIVE
RPR SER QL: NON REACTIVE
RUBV IGG SERPL IA-ACNC: 2.12 INDEX
SL AMB INTERPRETATION: NORMAL
SP GR UR: 1.01 (ref 1–1.03)
UROBILINOGEN UR STRIP-ACNC: 0.2 MG/DL (ref 0.2–1)
WBC # BLD AUTO: 5.7 X10E3/UL (ref 3.4–10.8)
WBC #/AREA URNS HPF: ABNORMAL /HPF (ref 0–5)

## 2024-01-08 DIAGNOSIS — O21.9 NAUSEA AND VOMITING IN PREGNANCY: Primary | ICD-10-CM

## 2024-01-08 RX ORDER — ONDANSETRON 8 MG/1
8 TABLET, ORALLY DISINTEGRATING ORAL EVERY 8 HOURS PRN
Qty: 40 TABLET | Refills: 1 | Status: SHIPPED | OUTPATIENT
Start: 2024-01-08

## 2024-01-23 ENCOUNTER — PATIENT MESSAGE (OUTPATIENT)
Dept: GASTROENTEROLOGY | Facility: CLINIC | Age: 39
End: 2024-01-23

## 2024-01-23 DIAGNOSIS — K21.9 GASTROESOPHAGEAL REFLUX DISEASE WITHOUT ESOPHAGITIS: ICD-10-CM

## 2024-01-23 DIAGNOSIS — K29.70 GASTRITIS DETERMINED BY ENDOSCOPY: Primary | ICD-10-CM

## 2024-01-23 RX ORDER — OMEPRAZOLE 40 MG/1
40 CAPSULE, DELAYED RELEASE ORAL 2 TIMES DAILY
Qty: 60 CAPSULE | Refills: 5 | Status: SHIPPED | OUTPATIENT
Start: 2024-01-23 | End: 2024-01-24

## 2024-01-24 ENCOUNTER — ROUTINE PRENATAL (OUTPATIENT)
Dept: OBGYN CLINIC | Facility: CLINIC | Age: 39
End: 2024-01-24
Payer: COMMERCIAL

## 2024-01-24 VITALS — BODY MASS INDEX: 18.19 KG/M2 | WEIGHT: 106 LBS | SYSTOLIC BLOOD PRESSURE: 98 MMHG | DIASTOLIC BLOOD PRESSURE: 50 MMHG

## 2024-01-24 DIAGNOSIS — O99.340 DEPRESSION AFFECTING PREGNANCY: ICD-10-CM

## 2024-01-24 DIAGNOSIS — O31.10X0 VANISHING TWIN SYNDROME: Primary | ICD-10-CM

## 2024-01-24 DIAGNOSIS — O09.522 MULTIGRAVIDA OF ADVANCED MATERNAL AGE IN SECOND TRIMESTER: ICD-10-CM

## 2024-01-24 DIAGNOSIS — O21.9 NAUSEA AND VOMITING IN PREGNANCY: ICD-10-CM

## 2024-01-24 DIAGNOSIS — Z3A.14 14 WEEKS GESTATION OF PREGNANCY: ICD-10-CM

## 2024-01-24 DIAGNOSIS — Z36.1 ANTENATAL SCREENING FOR RAISED ALPHAFETOPROTEIN LEVEL: ICD-10-CM

## 2024-01-24 DIAGNOSIS — F32.A DEPRESSION AFFECTING PREGNANCY: ICD-10-CM

## 2024-01-24 LAB
SL AMB  POCT GLUCOSE, UA: NEGATIVE
SL AMB POCT URINE PROTEIN: NEGATIVE

## 2024-01-24 PROCEDURE — 81002 URINALYSIS NONAUTO W/O SCOPE: CPT | Performed by: STUDENT IN AN ORGANIZED HEALTH CARE EDUCATION/TRAINING PROGRAM

## 2024-01-24 PROCEDURE — PNV: Performed by: STUDENT IN AN ORGANIZED HEALTH CARE EDUCATION/TRAINING PROGRAM

## 2024-01-24 NOTE — PROGRESS NOTES
Routine Prenatal Visit  [facility] - [address], [address], [place] [address]    Assessment/Plan:  Contreras is a 38 y.o. year old  at 14w5d who presents for routine prenatal visit.     1. Vanishing twin syndrome  Assessment & Plan:  - Single live fetus noted on ultrasound today. There is a small, collapsed second gestational sac with internal debris consistent with early fetal pole. I reviewed diagnosis of demise of one twin with patient and subsequently with her  via FaceTime. We discussed that I suspect that this demise occurred shortly after her viability ultrasound at 8 weeks based on small fetal pole. We reviewed that the other fetus appears grossly normal with normal heart rate and active movement. Discussed overall good prognosis for the remaining twin.     Orders:  -     Ambulatory Referral to Social Work Care Management Program; Future    2. Multigravida of advanced maternal age in second trimester  Assessment & Plan:  - Continue  mg PO daily for pre-eclampsia risk reduction.       3. Nausea and vomiting in pregnancy  Assessment & Plan:  - Symptoms improving. Has been able to switch back from FA supplement to routine prenatal vitamin. Takes Zofran twice daily.      4. Depression affecting pregnancy    5. 14 weeks gestation of pregnancy  Assessment & Plan:  - Prenatal lab results reviewed. Order for msAFP provided.   - Patient has early US scheduled with [facility] at 16 weeks.   - Problem list updated, results console reviewed and updated with pertinent prenatal labs.  - PMH, PSH, medications reviewed and updated as needed.  - Return to office in 4 wk(s) for routine prenatal care.      Orders:  -     POCT urine dip  -     Alpha fetoprotein, maternal; Future  -     Alpha fetoprotein, maternal    6.  screening for raised alphafetoprotein level          Subjective:   Contreras Rosenthal is a 38 y.o.  who presents for routine prenatal care at 14w5d.  Complaints  today: None  LOF: No; VB: No; Contractions: No    Objective:  BP 98/50   Wt 48.1 kg (106 lb)   LMP 10/13/2023 (Exact Date)   BMI 18.19 kg/m²     General: Well appearing, no distress  Respiratory: Unlabored breathing  Cardiovascular: Regular rate.  Abdomen: Soft, gravid, nontender  Extremities: Warm and well perfused.  Non tender.    Pregravid Weight/BMI: 46.3 kg (102 lb) (BMI 17.50)  Current Weight: 48.1 kg (106 lb)   Total Weight Gain: 1.814 kg (4 lb)     Pre-Renny Vitals      Flowsheet Row Most Recent Value   Prenatal Assessment    Fetal Heart Rate 146/absent   Movement Absent   Prenatal Vitals    Blood Pressure 98/50   Weight - Scale 48.1 kg (106 lb)   Urine Albumin/Glucose    Dilation/Effacement/Station    Vaginal Drainage    Draining Fluid No   Edema    LLE Edema None   RLE Edema None   Facial Edema None             Johann Chauhan MD  2024 10:45 AM

## 2024-01-24 NOTE — ASSESSMENT & PLAN NOTE
- Symptoms improving. Has been able to switch back from FA supplement to routine prenatal vitamin. Takes Zofran twice daily.

## 2024-01-24 NOTE — ASSESSMENT & PLAN NOTE
- Single live fetus noted on ultrasound today. There is a small, collapsed second gestational sac with internal debris consistent with early fetal pole. I reviewed diagnosis of demise of one twin with patient and subsequently with her  via FaceTime. We discussed that I suspect that this demise occurred shortly after her viability ultrasound at 8 weeks based on small fetal pole. We reviewed that the other fetus appears grossly normal with normal heart rate and active movement. Discussed overall good prognosis for the remaining twin.

## 2024-01-24 NOTE — ASSESSMENT & PLAN NOTE
- Prenatal lab results reviewed. Order for msAFP provided.   - Patient has early US scheduled with [facility] at 16 weeks.   - Problem list updated, results console reviewed and updated with pertinent prenatal labs.  - PMH, PSH, medications reviewed and updated as needed.  - Return to office in 4 wk(s) for routine prenatal care.

## 2024-02-01 ENCOUNTER — PATIENT OUTREACH (OUTPATIENT)
Dept: OBGYN CLINIC | Facility: CLINIC | Age: 39
End: 2024-02-01

## 2024-02-01 NOTE — PROGRESS NOTES
MERRY referred by Dr. Chauhan for support following diagnosis of vanishing twin syndrome at appointment on 1/24/24. MERRY called patient, who did not answer. MERRY left  requesting a call back.

## 2024-02-07 ENCOUNTER — ROUTINE PRENATAL (OUTPATIENT)
Dept: OBGYN CLINIC | Facility: CLINIC | Age: 39
End: 2024-02-07
Payer: COMMERCIAL

## 2024-02-07 ENCOUNTER — PATIENT OUTREACH (OUTPATIENT)
Dept: OBGYN CLINIC | Facility: CLINIC | Age: 39
End: 2024-02-07

## 2024-02-07 VITALS
SYSTOLIC BLOOD PRESSURE: 94 MMHG | HEIGHT: 64 IN | DIASTOLIC BLOOD PRESSURE: 54 MMHG | BODY MASS INDEX: 18.95 KG/M2 | WEIGHT: 111 LBS

## 2024-02-07 DIAGNOSIS — O31.10X0 VANISHING TWIN SYNDROME: ICD-10-CM

## 2024-02-07 DIAGNOSIS — O09.522 MULTIGRAVIDA OF ADVANCED MATERNAL AGE IN SECOND TRIMESTER: Primary | ICD-10-CM

## 2024-02-07 DIAGNOSIS — O99.340 DEPRESSION AFFECTING PREGNANCY: ICD-10-CM

## 2024-02-07 DIAGNOSIS — Z3A.16 16 WEEKS GESTATION OF PREGNANCY: ICD-10-CM

## 2024-02-07 DIAGNOSIS — F32.A DEPRESSION AFFECTING PREGNANCY: ICD-10-CM

## 2024-02-07 LAB
SL AMB  POCT GLUCOSE, UA: NORMAL
SL AMB POCT URINE PROTEIN: NORMAL

## 2024-02-07 PROCEDURE — PNV: Performed by: OBSTETRICS & GYNECOLOGY

## 2024-02-07 PROCEDURE — 81002 URINALYSIS NONAUTO W/O SCOPE: CPT | Performed by: OBSTETRICS & GYNECOLOGY

## 2024-02-07 NOTE — PROGRESS NOTES
Patient presented for OB visit this morning. Dr. Kumar spoke with patient about interest in SW services following loss of one twin. Patient reported feeling quite sad/shocked at first following appointment with Dr. Chauhan on 1/24/24, but has had significant family support (both she and FOB have many siblings) through this time. Per Dr. Kumar, patient reports she is doing okay today and declined needing SW support at this time. SW closing current referral, please re-refer as needed.

## 2024-02-07 NOTE — PROGRESS NOTES
"Routine Prenatal Visit  [facility] - [address], [address], [place] [address]    Assessment/Plan:  Contreras is a 38 y.o. year old  at 16w5d who presents for routine prenatal visit.     1. Multigravida of advanced maternal age in second trimester    2. Depression affecting pregnancy    3. Vanishing twin syndrome  Assessment & Plan:  Doing OK emotionally after initial shock. Has good support      4. 16 weeks gestation of pregnancy  Assessment & Plan:  MSAFP previously ordered and she plans to go in the next week or so.    Orders:  -     POCT urine dip          Subjective:     CC: Prenatal care    Contreras Rosenthal is a 38 y.o.  female who presents for routine prenatal care at 16w5d.  Pregnancy ROS: no leakage of fluid, pelvic pain, or vaginal bleeding.  no fetal movement.    The following portions of the patient's history were reviewed and updated as appropriate: allergies, current medications, past family history, past medical history, obstetric history, gynecologic history, past social history, past surgical history and problem list.      Objective:  BP 94/54 (BP Location: Left arm, Patient Position: Sitting, Cuff Size: Standard)   Ht 5' 4\" (1.626 m)   Wt 50.3 kg (111 lb)   LMP 10/13/2023 (Exact Date)   BMI 19.05 kg/m²   Pregravid Weight/BMI: 46.3 kg (102 lb) (BMI 17.50)  Current Weight: 50.3 kg (111 lb)   Total Weight Gain: 4.082 kg (9 lb)   Pre-Renny Vitals      Flowsheet Row Most Recent Value   Prenatal Assessment    Fetal Heart Rate 150   Fundal Height (cm) 18 cm   Movement Present   Prenatal Vitals    Blood Pressure 94/54   Weight - Scale 50.3 kg (111 lb)   Urine Albumin/Glucose    Dilation/Effacement/Station    Vaginal Drainage    Edema              General: Well appearing, no distress  Respiratory: Unlabored breathing  Cardiovascular: Regular rate.  Abdomen: Soft, gravid, nontender  Fundal Height: Appropriate for gestational age.  Extremities: Warm and well perfused.  " Non tender.

## 2024-02-08 ENCOUNTER — ULTRASOUND (OUTPATIENT)
Facility: HOSPITAL | Age: 39
End: 2024-02-08
Payer: COMMERCIAL

## 2024-02-08 VITALS
SYSTOLIC BLOOD PRESSURE: 128 MMHG | HEIGHT: 64 IN | DIASTOLIC BLOOD PRESSURE: 64 MMHG | BODY MASS INDEX: 19.16 KG/M2 | WEIGHT: 112.21 LBS | HEART RATE: 107 BPM

## 2024-02-08 DIAGNOSIS — O09.522 MULTIGRAVIDA OF ADVANCED MATERNAL AGE IN SECOND TRIMESTER: ICD-10-CM

## 2024-02-08 DIAGNOSIS — Z36.3 ENCOUNTER FOR ANTENATAL SCREENING FOR MALFORMATIONS: ICD-10-CM

## 2024-02-08 DIAGNOSIS — O31.10X0 VANISHING TWIN SYNDROME: Primary | ICD-10-CM

## 2024-02-08 PROCEDURE — 76805 OB US >/= 14 WKS SNGL FETUS: CPT | Performed by: OBSTETRICS & GYNECOLOGY

## 2024-02-08 PROCEDURE — 99203 OFFICE O/P NEW LOW 30 MIN: CPT | Performed by: OBSTETRICS & GYNECOLOGY

## 2024-02-08 NOTE — LETTER
"   Date: 2024    Sharif Kumar MD  [address]  [address]  [facility] [address]    Patient: Contreras Rosenthal   YOB: 1985   Date of Visit: 2024   Gestational age 16w6d   Nature of this communication: Routine       Dear Sharif,    This patient was seen recently in our  office.  Please see ultrasound report under \"OB Procedures\" tab.  Please don't hesitate to contact our office with any concerns or questions.      Sincerely,      Mechelle Goddard MD  Attending Physician, Maternal-Fetal Medicine  [facility]      "

## 2024-02-08 NOTE — PROGRESS NOTES
"[facility]: Ms. Rosenthal was seen today for anatomic survey ultrasound.  See ultrasound report under \"OB Procedures\" tab.      MDM:   I. Diagnoses/Problems addressed:  Acute uncomplicated illness/injury: vanishing twin (ex. GDMA1, MO, UTI)  II.  Data: I reviewed notes from referring provider and results of ultrasound reports.  III.  Risk of morbidity:  underweight and AMA    Please don't hesitate to contact our office with any concerns or questions.  -Mechelle Godadrd MD    "

## 2024-02-21 LAB
2ND TRIMESTER 4 SCREEN SERPL-IMP: NORMAL
AFP ADJ MOM SERPL: 1.19
AFP INTERP AMN-IMP: NORMAL
AFP INTERP SERPL-IMP: NORMAL
AFP INTERP SERPL-IMP: NORMAL
AFP SERPL-MCNC: 70.3 NG/ML
AGE AT DELIVERY: 38.3 YR
GA METHOD: NORMAL
GA: 18.4 WEEKS
IDDM PATIENT QL: NO
MULTIPLE PREGNANCY: NO
NEURAL TUBE DEFECT RISK FETUS: 6704 %

## 2024-03-05 ENCOUNTER — ROUTINE PRENATAL (OUTPATIENT)
Dept: OBGYN CLINIC | Facility: CLINIC | Age: 39
End: 2024-03-05
Payer: COMMERCIAL

## 2024-03-05 VITALS
HEIGHT: 64 IN | SYSTOLIC BLOOD PRESSURE: 90 MMHG | WEIGHT: 114 LBS | DIASTOLIC BLOOD PRESSURE: 60 MMHG | BODY MASS INDEX: 19.46 KG/M2

## 2024-03-05 DIAGNOSIS — O09.522 MULTIGRAVIDA OF ADVANCED MATERNAL AGE IN SECOND TRIMESTER: Primary | ICD-10-CM

## 2024-03-05 DIAGNOSIS — Z3A.20 20 WEEKS GESTATION OF PREGNANCY: ICD-10-CM

## 2024-03-05 DIAGNOSIS — O31.10X0 VANISHING TWIN SYNDROME: ICD-10-CM

## 2024-03-05 LAB
SL AMB  POCT GLUCOSE, UA: NORMAL
SL AMB POCT URINE PROTEIN: NORMAL

## 2024-03-05 PROCEDURE — 81002 URINALYSIS NONAUTO W/O SCOPE: CPT | Performed by: OBSTETRICS & GYNECOLOGY

## 2024-03-05 PROCEDURE — PNV: Performed by: OBSTETRICS & GYNECOLOGY

## 2024-03-05 NOTE — PROGRESS NOTES
"Routine Prenatal Visit  [facility] - [place]  [address] [place] [address]    Assessment/Plan:  Contreras is a 38 y.o. year old  at 20w4d who presents for routine prenatal visit.     1. Multigravida of advanced maternal age in second trimester    2. Vanishing twin syndrome    3. 20 weeks gestation of pregnancy  Assessment & Plan:  Repeat anatomy next week. Reviewed normal AFP    Orders:  -     POCT urine dip          Subjective:   Contreras Rosenthal is a 38 y.o.  who presents for routine prenatal care at 20w4d.  Complaints today: Denies  LOF: -; VB: -; Contractions: -; FM: +    Objective:  BP 90/60 (BP Location: Right arm, Patient Position: Sitting, Cuff Size: Standard)   Ht 5' 4\" (1.626 m)   Wt 51.7 kg (114 lb)   LMP 10/13/2023 (Exact Date)   BMI 19.57 kg/m²     General: Well appearing, no distress  Respiratory: Unlabored breathing  Abdomen: Soft, gravid, nontender  Extremities: Warm and well perfused.  Non tender.    Pregravid Weight/BMI: 46.3 kg (102 lb) (BMI 17.50)  Current Weight: 51.7 kg (114 lb)   Total Weight Gain: 5.443 kg (12 lb)     Pre- Vitals      Flowsheet Row Most Recent Value   Prenatal Assessment    Fetal Heart Rate 160   Movement Present   Prenatal Vitals    Blood Pressure 90/60   Weight - Scale 51.7 kg (114 lb)   Urine Albumin/Glucose    Dilation/Effacement/Station    Vaginal Drainage    Edema              Marlys Maravilla DO  3/5/2024 10:18 AM    "

## 2024-03-11 ENCOUNTER — ULTRASOUND (OUTPATIENT)
Dept: PERINATAL CARE | Facility: OTHER | Age: 39
End: 2024-03-11
Payer: COMMERCIAL

## 2024-03-11 VITALS
BODY MASS INDEX: 19.53 KG/M2 | SYSTOLIC BLOOD PRESSURE: 92 MMHG | DIASTOLIC BLOOD PRESSURE: 50 MMHG | HEIGHT: 64 IN | HEART RATE: 88 BPM | WEIGHT: 114.4 LBS

## 2024-03-11 DIAGNOSIS — Z36.89 ENCOUNTER FOR OTHER SPECIFIED ANTENATAL SCREENING: ICD-10-CM

## 2024-03-11 DIAGNOSIS — O09.522 MULTIGRAVIDA OF ADVANCED MATERNAL AGE IN SECOND TRIMESTER: ICD-10-CM

## 2024-03-11 DIAGNOSIS — Z36.3 ENCOUNTER FOR ANTENATAL SCREENING FOR MALFORMATIONS: Primary | ICD-10-CM

## 2024-03-11 DIAGNOSIS — Z36.86 ENCOUNTER FOR ANTENATAL SCREENING FOR CERVICAL LENGTH: ICD-10-CM

## 2024-03-11 PROCEDURE — 76817 TRANSVAGINAL US OBSTETRIC: CPT | Performed by: OBSTETRICS & GYNECOLOGY

## 2024-03-11 PROCEDURE — 76811 OB US DETAILED SNGL FETUS: CPT | Performed by: OBSTETRICS & GYNECOLOGY

## 2024-03-11 PROCEDURE — 99213 OFFICE O/P EST LOW 20 MIN: CPT | Performed by: OBSTETRICS & GYNECOLOGY

## 2024-03-11 NOTE — PROGRESS NOTES
Ultrasound Probe Disinfection    A transvaginal ultrasound was performed.   Prior to use, disinfection was performed with High Level Disinfection Process ([email or web address]).  Probe serial number [id number]: [id number] was used.      Rere De La Torre  03/11/24  12:46 PM

## 2024-03-12 ENCOUNTER — OFFICE VISIT (OUTPATIENT)
Dept: GASTROENTEROLOGY | Facility: CLINIC | Age: 39
End: 2024-03-12
Payer: COMMERCIAL

## 2024-03-12 VITALS
DIASTOLIC BLOOD PRESSURE: 72 MMHG | WEIGHT: 115.4 LBS | HEIGHT: 65 IN | SYSTOLIC BLOOD PRESSURE: 96 MMHG | BODY MASS INDEX: 19.22 KG/M2

## 2024-03-12 DIAGNOSIS — O21.0 HYPEREMESIS GRAVIDARUM: Primary | ICD-10-CM

## 2024-03-12 DIAGNOSIS — Z3A.20 20 WEEKS GESTATION OF PREGNANCY: ICD-10-CM

## 2024-03-12 DIAGNOSIS — K21.9 GASTROESOPHAGEAL REFLUX DISEASE WITHOUT ESOPHAGITIS: ICD-10-CM

## 2024-03-12 DIAGNOSIS — K51.30 ULCERATIVE RECTOSIGMOIDITIS WITHOUT COMPLICATION (HCC): ICD-10-CM

## 2024-03-12 PROCEDURE — 99213 OFFICE O/P EST LOW 20 MIN: CPT | Performed by: INTERNAL MEDICINE

## 2024-03-12 RX ORDER — MESALAMINE 1.2 G/1
4800 TABLET, DELAYED RELEASE ORAL DAILY
Qty: 360 TABLET | Refills: 1 | Status: SHIPPED | OUTPATIENT
Start: 2024-03-12 | End: 2024-03-21

## 2024-03-12 NOTE — PROGRESS NOTES
[facility] - Outpatient Follow-up Note  Contreras Rosenthal 38 y.o. female MRN: [id number]  Encounter: [id number]    ASSESSMENT AND PLAN:      1. Ulcerative rectosigmoiditis without complication (HCC)  Diagnosed 2013 and treated with mesalamine.  Also had a flare-up in 2014 associated with C diff. off medications for years before a flare-up in July/August 2022.  Treated with prednisone, colonoscopy September 2022 showed mild pancolitis, ileal biopsy showed active inflammation but no signs of chronicity.  Small bowel capsule negative for small bowel inflammation    Currently in clinical remission on mesalamine 4.8 g daily.  Will continue this dose during pregnancy.  We discussed surveillance colonoscopy after delivery, with optimizing medication doses.    2. Hyperemesis gravidarum  Present in first trimester, now resolved.  Has not required Zofran    3. Gastroesophageal reflux disease without esophagitis  Transiently required Prilosec, now asymptomatic.  Okay to use antacids as needed    4. 20 weeks gestation of pregnancy  Continue close follow-up with OB      Followup Appointment: 6 months  ______________________________________________________________________    Chief Complaint   Patient presents with    Follow-up     Doing well     HPI: The patient presents for follow-up on ulcerative colitis and GERD.  She is accompanied by her 2 children.  She was last seen in the office in December.  She is currently 21 weeks pregnant.  GI symptoms have remained well-controlled over the past few months.  She had vomiting issues early in pregnancy but these have resolved.  She has not required any Zofran recently.  Reflux symptoms are well-controlled and she is off Prilosec.  Symptoms remain well-controlled on prophylactic aspirin.  She denies any lower GI complaints.  There is been no diarrhea or rectal bleeding.  She is very satisfied with symptom improvement.    Historical Information   Past  "Medical History:   Diagnosis Date    Anemia     Anxiety     managed by PCP    C. difficile colitis 2016    during pregnancy    Irritable bowel syndrome     LGSIL on Pap smear of cervix     had leep 2016    Ulcerative colitis (HCC)     Dr. Chowdary     Past Surgical History:   Procedure Laterality Date    CERVICAL BIOPSY  W/ LOOP ELECTRODE EXCISION  2016    COLONOSCOPY      COLONOSCOPY  09/06/2022    mild pancolitis    WISDOM TOOTH EXTRACTION  2000     Social History     Substance and Sexual Activity   Alcohol Use Never     Social History     Substance and Sexual Activity   Drug Use Never    Comment: No use     Social History     Tobacco Use   Smoking Status Never    Passive exposure: Never   Smokeless Tobacco Never     Family History   Problem Relation Age of Onset    Heart disease Father     Hypertension Father     Thyroid disease Father     Anemia Sister     Depression Sister     Inflammatory bowel disease Sister     Breast cancer Sister     Irritable bowel syndrome Sister     Ulcerative colitis Sister     No Known Problems Sister     Breast cancer Sister 42    No Known Problems Sister     Colon polyps Brother     Depression Brother     No Known Problems Brother     No Known Problems Daughter     No Known Problems Son     Colon cancer Neg Hx     Ovarian cancer Neg Hx     Uterine cancer Neg Hx     Prostate cancer Neg Hx     Pancreatic cancer Neg Hx          Current Outpatient Medications:     aspirin 81 mg chewable tablet    mesalamine (LIALDA) 1.2 g EC tablet    Prenatal Vit-Fe Fumarate-FA (PRENATAL VITAMIN PO)    sertraline (ZOLOFT) 100 mg tablet    traZODone (DESYREL) 100 mg tablet  Allergies   Allergen Reactions    Amoxicillin Other (See Comments), Diarrhea, GI Bleeding and GI Intolerance     C-diff, per pt  C-diff, per pt     Reviewed medications and allergies and updated as indicated    PHYSICAL EXAM:    Blood pressure 96/72, height 5' 4.5\" (1.638 m), weight 52.3 kg (115 lb 6.4 oz), last menstrual period " 10/13/2023, not currently breastfeeding. Body mass index is 19.5 kg/m².  General Appearance: NAD, cooperative, alert  Eyes: Anicteric, conjunctiva pink  ENT:  Normocephalic, atraumatic, normal mucosa.    Neck:  Supple, symmetrical, trachea midline  Resp:  Clear to auscultation bilaterally; no rales, rhonchi or wheezing; respirations unlabored   CV:  S1 S2, Regular rate and rhythm; no murmur, rub, or gallop.  GI:  Soft, non-tender, non-distended; normal bowel sounds; no masses, no organomegaly   Rectal: Deferred  Musculoskeletal: No cyanosis, clubbing or edema. Normal ROM.  Skin:  No jaundice, rashes, or lesions   Heme/Lymph: No palpable cervical lymphadenopathy  Psych: Normal affect, good eye contact  Neuro: No gross deficits, AAOx3    Lab Results:   Lab Results   Component Value Date    WBC 5.7 12/27/2023    HGB 12.2 12/27/2023    HCT 35.8 12/27/2023    MCV 90 12/27/2023     12/27/2023     Lab Results   Component Value Date    K 4.3 06/06/2023     06/06/2023    CO2 23 06/06/2023    BUN 11 06/06/2023    CREATININE 0.87 06/06/2023    AST 26 06/06/2023    ALT 21 06/06/2023    EGFR 88 06/06/2023       Radiology Results:   No results found.      Answers submitted by the patient for this visit:  Inflammatory Bowel Disease (Submitted on 3/5/2024)  When you are not experiencing symptoms of your inflammatory bowel disease, how many bowel movements do you typically have each day?: 2  What is the average (typical) number of bowel movements that you had in a single day during the last week?: 2  Over the last 3 days, have you had any bowel movements where you passed blood without stool?: No  Since your last visit, have you received any vaccinations?: No  Since the last visit, have you had an infection?: No  Is there any possibility that you may be pregnant?: Yes  In the past three months, have you used tobacco in any form?: No  During the last year, how many days have you missed work or school because of your  inflammatory bowel disease?: 0  During the last year, how many days have you been hospitalized because of your inflammatory bowel disease?: 0  During the last year, how many days have you visited a hospital emergency department because of your inflammatory bowel disease?: 0  During the last month, have you taken narcotic pain medications (such as Percocet, oxycodone, Oxycontin, morphine, Vicodin, Dilaudid, MS Contin) for your inflammatory bowel disease?: No  Have you awoken at night because you needed to move your bowels during the last month? : No  Have you had leakage of stool while sleeping during the last month?: No  Have you had leakage of stool while you were awake during the last month?: No  In the last 6 months, have you unintentionally lost weight?: No  Fever: No  Eye irritation: No  Mouth sores: No  Sore throat: No  Chest pain: No  Shortness of breath: No  Numbness or tingling in your hands or feet: No  Skin rash: No  Pain or swelling in your joints: No  Bruising or bleeding: No  Felt depressed or blue: No

## 2024-03-12 NOTE — PROGRESS NOTES
"[facility]: Ms. Rosenthal was seen today for anatomic survey and cervical length screening ultrasound.  See ultrasound report under \"OB Procedures\" tab.   The time spent on this established patient on the encounter date included 5 minutes previsit service time reviewing records and precharting, 5 minutes face-to-face service time counseling regarding results and coordinating care, and  4 minutes charting, totalling 14 minutes.  Please don't hesitate to contact our office with any concerns or questions.  -Mechelle Goddard MD      "

## 2024-03-13 ENCOUNTER — TELEPHONE (OUTPATIENT)
Dept: PERINATAL CARE | Facility: OTHER | Age: 39
End: 2024-03-13

## 2024-03-14 ENCOUNTER — PATIENT MESSAGE (OUTPATIENT)
Dept: GASTROENTEROLOGY | Facility: CLINIC | Age: 39
End: 2024-03-14

## 2024-03-17 ENCOUNTER — PATIENT MESSAGE (OUTPATIENT)
Dept: GASTROENTEROLOGY | Facility: CLINIC | Age: 39
End: 2024-03-17

## 2024-03-17 DIAGNOSIS — K51.30 ULCERATIVE RECTOSIGMOIDITIS WITHOUT COMPLICATION (HCC): Primary | ICD-10-CM

## 2024-03-21 RX ORDER — SULFASALAZINE 500 MG/1
500 TABLET, DELAYED RELEASE ORAL 4 TIMES DAILY
Qty: 120 TABLET | Refills: 5 | Status: SHIPPED | OUTPATIENT
Start: 2024-03-21

## 2024-04-01 PROBLEM — Z3A.24 24 WEEKS GESTATION OF PREGNANCY: Status: ACTIVE | Noted: 2023-12-08

## 2024-04-03 ENCOUNTER — ROUTINE PRENATAL (OUTPATIENT)
Dept: OBGYN CLINIC | Facility: CLINIC | Age: 39
End: 2024-04-03
Payer: COMMERCIAL

## 2024-04-03 VITALS — DIASTOLIC BLOOD PRESSURE: 58 MMHG | SYSTOLIC BLOOD PRESSURE: 96 MMHG | WEIGHT: 119 LBS | BODY MASS INDEX: 20.11 KG/M2

## 2024-04-03 DIAGNOSIS — O99.342 ANXIETY DURING PREGNANCY IN SECOND TRIMESTER, ANTEPARTUM: ICD-10-CM

## 2024-04-03 DIAGNOSIS — Z36.89 ENCOUNTER FOR OTHER SPECIFIED ANTENATAL SCREENING: ICD-10-CM

## 2024-04-03 DIAGNOSIS — O09.522 MULTIGRAVIDA OF ADVANCED MATERNAL AGE IN SECOND TRIMESTER: Primary | ICD-10-CM

## 2024-04-03 DIAGNOSIS — F41.9 ANXIETY DURING PREGNANCY IN SECOND TRIMESTER, ANTEPARTUM: ICD-10-CM

## 2024-04-03 DIAGNOSIS — Z3A.24 24 WEEKS GESTATION OF PREGNANCY: ICD-10-CM

## 2024-04-03 LAB
SL AMB  POCT GLUCOSE, UA: NORMAL
SL AMB POCT URINE PROTEIN: NORMAL

## 2024-04-03 PROCEDURE — 81002 URINALYSIS NONAUTO W/O SCOPE: CPT | Performed by: OBSTETRICS & GYNECOLOGY

## 2024-04-03 PROCEDURE — PNV: Performed by: OBSTETRICS & GYNECOLOGY

## 2024-04-03 NOTE — PROGRESS NOTES
Routine Prenatal Visit  [facility] - [address], [address], [place] [address]    Assessment/Plan:  Contreras is a 39 y.o. year old  at 24w5d who presents for routine prenatal visit.     1. Multigravida of advanced maternal age in second trimester  Assessment & Plan:  Continue ASA until 36 weeks.  3rd trimester growth scheduled.      2. Anxiety during pregnancy in second trimester, antepartum  Assessment & Plan:  Well controlled      3. Encounter for other specified  screening  -     Glucose, 1H PG; Future  -     CBC; Future  -     RPR, Rfx Qn RPR/Confirm TP; Future  -     Glucose, 1H PG  -     CBC  -     RPR, Rfx Qn RPR/Confirm TP    4. 24 weeks gestation of pregnancy  -     POCT urine dip        Next OB Visit 3 weeks, then every 2 weeks.    Subjective:     CC: Prenatal care    Contreras Rosenthal is a 39 y.o.  female who presents for routine prenatal care at 24w5d.  Pregnancy ROS: no leakage of fluid, pelvic pain, or vaginal bleeding.  normal fetal movement.    The following portions of the patient's history were reviewed and updated as appropriate: allergies, current medications, past family history, past medical history, obstetric history, gynecologic history, past social history, past surgical history and problem list.      Objective:  BP 96/58   Wt 54 kg (119 lb)   LMP 10/13/2023 (Exact Date)   BMI 20.11 kg/m²   Pregravid Weight/BMI: 46.3 kg (102 lb) (BMI 17.24)  Current Weight: 54 kg (119 lb)   Total Weight Gain: 7.711 kg (17 lb)   Pre-Renny Vitals      Flowsheet Row Most Recent Value   Prenatal Assessment    Fetal Heart Rate 150   Fundal Height (cm) 24 cm   Movement Present   Prenatal Vitals    Blood Pressure 96/58   Weight - Scale 54 kg (119 lb)   Urine Albumin/Glucose    Dilation/Effacement/Station    Vaginal Drainage    Edema    LLE Edema None   RLE Edema None             General: Well appearing, no distress  Abdomen: Soft, gravid, nontender  Extremities: Non  tender.

## 2024-04-25 ENCOUNTER — ROUTINE PRENATAL (OUTPATIENT)
Dept: OBGYN CLINIC | Facility: CLINIC | Age: 39
End: 2024-04-25
Payer: COMMERCIAL

## 2024-04-25 VITALS — WEIGHT: 119.4 LBS | DIASTOLIC BLOOD PRESSURE: 56 MMHG | SYSTOLIC BLOOD PRESSURE: 94 MMHG | BODY MASS INDEX: 20.18 KG/M2

## 2024-04-25 DIAGNOSIS — F41.9 ANXIETY DURING PREGNANCY IN SECOND TRIMESTER, ANTEPARTUM: ICD-10-CM

## 2024-04-25 DIAGNOSIS — Z3A.27 27 WEEKS GESTATION OF PREGNANCY: Primary | ICD-10-CM

## 2024-04-25 DIAGNOSIS — O31.10X0 VANISHING TWIN SYNDROME: ICD-10-CM

## 2024-04-25 DIAGNOSIS — O21.9 NAUSEA AND VOMITING IN PREGNANCY: ICD-10-CM

## 2024-04-25 DIAGNOSIS — O99.342 ANXIETY DURING PREGNANCY IN SECOND TRIMESTER, ANTEPARTUM: ICD-10-CM

## 2024-04-25 DIAGNOSIS — O09.522 MULTIGRAVIDA OF ADVANCED MATERNAL AGE IN SECOND TRIMESTER: ICD-10-CM

## 2024-04-25 LAB
SL AMB  POCT GLUCOSE, UA: NEGATIVE
SL AMB POCT URINE PROTEIN: NEGATIVE

## 2024-04-25 PROCEDURE — 81002 URINALYSIS NONAUTO W/O SCOPE: CPT | Performed by: OBSTETRICS & GYNECOLOGY

## 2024-04-25 PROCEDURE — PNV: Performed by: OBSTETRICS & GYNECOLOGY

## 2024-04-25 NOTE — PROGRESS NOTES
Routine Prenatal Visit  [facility] - [address], [address], [place] [address]    Assessment/Plan:  Contreras is a 39 y.o. year old  at 27w6d who presents for routine prenatal visit.     1. 27 weeks gestation of pregnancy  -     POCT urine dip    2. Multigravida of advanced maternal age in second trimester    3. Anxiety during pregnancy in second trimester, antepartum    4. Vanishing twin syndrome    5. Nausea and vomiting in pregnancy        Next OB Visit 2 weeks.    Subjective:     CC: Prenatal care    Contreras Rosenthal is a 39 y.o.  female who presents for routine prenatal care at 27w6d.  Pregnancy ROS: no leakage of fluid, pelvic pain, or vaginal bleeding.  normal fetal movement.    The following portions of the patient's history were reviewed and updated as appropriate: allergies, current medications, past family history, past medical history, obstetric history, gynecologic history, past social history, past surgical history and problem list.      Objective:  BP 94/56   Wt 54.2 kg (119 lb 6.4 oz)   LMP 10/13/2023 (Exact Date)   BMI 20.18 kg/m²   Pregravid Weight/BMI: 46.3 kg (102 lb) (BMI 17.24)  Current Weight: 54.2 kg (119 lb 6.4 oz)   Total Weight Gain: 7.893 kg (17 lb 6.4 oz)   Pre- Vitals      Flowsheet Row Most Recent Value   Prenatal Assessment    Fetal Heart Rate 140   Fundal Height (cm) 27 cm   Movement Present   Prenatal Vitals    Blood Pressure 94/56   Weight - Scale 54.2 kg (119 lb 6.4 oz)   Urine Albumin/Glucose    Dilation/Effacement/Station    Vaginal Drainage    Draining Fluid No   Edema              General: Well appearing, no distress  Abdomen: Soft, gravid, nontender  Extremities: Non tender.

## 2024-04-26 ENCOUNTER — DOCUMENTATION (OUTPATIENT)
Dept: OBGYN CLINIC | Facility: CLINIC | Age: 39
End: 2024-04-26

## 2024-04-29 DIAGNOSIS — O21.9 NAUSEA AND VOMITING IN PREGNANCY: Primary | ICD-10-CM

## 2024-04-29 LAB
EXTERNAL HEMOGLOBIN: 11.4 G/DL
EXTERNAL PLATELET COUNT: 164 K/ÂΜL
EXTERNAL SYPHILIS TOTAL IGG/IGM SCREENING: NORMAL

## 2024-04-29 RX ORDER — ONDANSETRON 8 MG/1
8 TABLET, ORALLY DISINTEGRATING ORAL EVERY 8 HOURS PRN
Qty: 30 TABLET | Refills: 1 | Status: SHIPPED | OUTPATIENT
Start: 2024-04-29

## 2024-04-30 LAB
ERYTHROCYTE [DISTWIDTH] IN BLOOD BY AUTOMATED COUNT: 12.4 % (ref 11.7–15.4)
GLUCOSE 1H P 50 G GLC PO SERPL-MCNC: 133 MG/DL (ref 70–139)
HCT VFR BLD AUTO: 34.7 % (ref 34–46.6)
HGB BLD-MCNC: 11.4 G/DL (ref 11.1–15.9)
MCH RBC QN AUTO: 30.9 PG (ref 26.6–33)
MCHC RBC AUTO-ENTMCNC: 32.9 G/DL (ref 31.5–35.7)
MCV RBC AUTO: 94 FL (ref 79–97)
PLATELET # BLD AUTO: 164 X10E3/UL (ref 150–450)
RBC # BLD AUTO: 3.69 X10E6/UL (ref 3.77–5.28)
RPR SER QL: NON REACTIVE
WBC # BLD AUTO: 6.1 X10E3/UL (ref 3.4–10.8)

## 2024-05-10 ENCOUNTER — ROUTINE PRENATAL (OUTPATIENT)
Dept: OBGYN CLINIC | Facility: CLINIC | Age: 39
End: 2024-05-10
Payer: COMMERCIAL

## 2024-05-10 VITALS
WEIGHT: 119.8 LBS | DIASTOLIC BLOOD PRESSURE: 54 MMHG | HEIGHT: 65 IN | BODY MASS INDEX: 19.96 KG/M2 | SYSTOLIC BLOOD PRESSURE: 96 MMHG

## 2024-05-10 DIAGNOSIS — O99.342 ANXIETY DURING PREGNANCY IN SECOND TRIMESTER, ANTEPARTUM: ICD-10-CM

## 2024-05-10 DIAGNOSIS — F41.9 ANXIETY DURING PREGNANCY IN SECOND TRIMESTER, ANTEPARTUM: ICD-10-CM

## 2024-05-10 DIAGNOSIS — O21.9 NAUSEA AND VOMITING IN PREGNANCY: ICD-10-CM

## 2024-05-10 DIAGNOSIS — O31.10X0 VANISHING TWIN SYNDROME: ICD-10-CM

## 2024-05-10 DIAGNOSIS — Z3A.30 30 WEEKS GESTATION OF PREGNANCY: ICD-10-CM

## 2024-05-10 DIAGNOSIS — Z23 NEED FOR TDAP VACCINATION: ICD-10-CM

## 2024-05-10 DIAGNOSIS — O09.522 MULTIGRAVIDA OF ADVANCED MATERNAL AGE IN SECOND TRIMESTER: Primary | ICD-10-CM

## 2024-05-10 LAB
DME PARACHUTE DELIVERY DATE REQUESTED: NORMAL
DME PARACHUTE DELIVERY NOTE: NORMAL
DME PARACHUTE ITEM DESCRIPTION: NORMAL
DME PARACHUTE ORDER STATUS: NORMAL
DME PARACHUTE SUPPLIER NAME: NORMAL
DME PARACHUTE SUPPLIER PHONE: NORMAL
SL AMB  POCT GLUCOSE, UA: NORMAL
SL AMB POCT URINE PROTEIN: NORMAL

## 2024-05-10 PROCEDURE — 81002 URINALYSIS NONAUTO W/O SCOPE: CPT | Performed by: STUDENT IN AN ORGANIZED HEALTH CARE EDUCATION/TRAINING PROGRAM

## 2024-05-10 PROCEDURE — PNV: Performed by: STUDENT IN AN ORGANIZED HEALTH CARE EDUCATION/TRAINING PROGRAM

## 2024-05-10 PROCEDURE — 90715 TDAP VACCINE 7 YRS/> IM: CPT | Performed by: STUDENT IN AN ORGANIZED HEALTH CARE EDUCATION/TRAINING PROGRAM

## 2024-05-10 PROCEDURE — 90471 IMMUNIZATION ADMIN: CPT | Performed by: STUDENT IN AN ORGANIZED HEALTH CARE EDUCATION/TRAINING PROGRAM

## 2024-05-10 NOTE — ASSESSMENT & PLAN NOTE
- PTL/PPROM/Bleeding precautions given. Kick counts reviewed.  - Third trimester labs reviewed.  - Recommendation for administration of TDaP was reviewed. TDaP administered today.  - Problem list updated, results console reviewed and updated with pertinent prenatal labs.  - PMH, PSH, medications reviewed and updated as needed  - Return to office in 2 wk(s) for routine prenatal care

## 2024-05-10 NOTE — PROGRESS NOTES
Hand / Upper Extremity Injection/Arthrocentesis: L long A1    Date/Time: 1/9/2024 2:16 PM    Performed by: TIFFANY Kern MD  Authorized by: TFIFANY Kern MD    Indications:  Pain  Needle Size:  25 G  Guidance: landmark    Condition: trigger finger    Location:  Long finger    Site:  L long A1  Medications:  40 mg triamcinolone 40 MG/ML  Outcome:  Tolerated well, no immediate complications  Procedure discussed: discussed risks, benefits, and alternatives    Consent Given by:  Patient  Timeout: timeout called immediately prior to procedure    Prep: patient was prepped and draped in usual sterile fashion         "Routine Prenatal Visit  [facility] - [address], [address], [place] [address]    Assessment/Plan:  Contreras is a 39 y.o. year old  at 30w0d who presents for routine prenatal visit.     1. Multigravida of advanced maternal age in second trimester  Assessment & Plan:  - Contine  mg PO daily until 36 weeks for pre-eclampsia risk reduction.   - Growth US scheduled with MFM at 32 weeks.       2. Anxiety during pregnancy in second trimester, antepartum    3. Vanishing twin syndrome    4. Nausea and vomiting in pregnancy    5. 30 weeks gestation of pregnancy  Assessment & Plan:  - PTL/PPROM/Bleeding precautions given. Kick counts reviewed.  - Third trimester labs reviewed.  - Recommendation for administration of TDaP was reviewed. TDaP administered today.  - Problem list updated, results console reviewed and updated with pertinent prenatal labs.  - PMH, PSH, medications reviewed and updated as needed  - Return to office in 2 wk(s) for routine prenatal care      Orders:  -     POCT urine dip    6. Need for Tdap vaccination  -     TDAP VACCINE GREATER THAN OR EQUAL TO 6YO IM          Subjective:   Contreras Rosenthal is a 39 y.o.  who presents for routine prenatal care at 30w0d.  Complaints today: No  LOF: No; VB: No; Contractions: No; FM: Present    Objective:  BP 96/54 (BP Location: Left arm, Patient Position: Sitting, Cuff Size: Standard)   Ht 5' 4.5\" (1.638 m)   Wt 54.3 kg (119 lb 12.8 oz)   LMP 10/13/2023 (Exact Date)   BMI 20.25 kg/m²     General: Well appearing, no distress  Respiratory: Unlabored breathing  Cardiovascular: Regular rate.  Abdomen: Soft, gravid, nontender  Extremities: Warm and well perfused.  Non tender.    Pregravid Weight/BMI: 46.3 kg (102 lb) (BMI 17.24)  Current Weight: 54.3 kg (119 lb 12.8 oz)   Total Weight Gain: 8.074 kg (17 lb 12.8 oz)     Pre- Vitals      Flowsheet Row Most Recent Value   Prenatal Assessment    Fetal Heart Rate 145   Fundal " Height (cm) 30 cm   Movement Present   Prenatal Vitals    Blood Pressure 96/54   Weight - Scale 54.3 kg (119 lb 12.8 oz)   Urine Albumin/Glucose    Dilation/Effacement/Station    Vaginal Drainage    Draining Fluid No   Edema    LLE Edema None   RLE Edema None   Facial Edema None             Johann Chauhan MD  5/10/2024 8:16 AM

## 2024-05-10 NOTE — ASSESSMENT & PLAN NOTE
- Contine  mg PO daily until 36 weeks for pre-eclampsia risk reduction.   - Growth US scheduled with MFM at 32 weeks.

## 2024-05-22 ENCOUNTER — ROUTINE PRENATAL (OUTPATIENT)
Dept: OBGYN CLINIC | Facility: CLINIC | Age: 39
End: 2024-05-22
Payer: COMMERCIAL

## 2024-05-22 VITALS
WEIGHT: 121.6 LBS | DIASTOLIC BLOOD PRESSURE: 54 MMHG | HEIGHT: 65 IN | SYSTOLIC BLOOD PRESSURE: 88 MMHG | BODY MASS INDEX: 20.26 KG/M2

## 2024-05-22 DIAGNOSIS — O21.9 NAUSEA AND VOMITING IN PREGNANCY: ICD-10-CM

## 2024-05-22 DIAGNOSIS — F41.9 ANXIETY DURING PREGNANCY IN SECOND TRIMESTER, ANTEPARTUM: ICD-10-CM

## 2024-05-22 DIAGNOSIS — O09.522 MULTIGRAVIDA OF ADVANCED MATERNAL AGE IN SECOND TRIMESTER: Primary | ICD-10-CM

## 2024-05-22 DIAGNOSIS — O99.342 ANXIETY DURING PREGNANCY IN SECOND TRIMESTER, ANTEPARTUM: ICD-10-CM

## 2024-05-22 DIAGNOSIS — O31.10X0 VANISHING TWIN SYNDROME: ICD-10-CM

## 2024-05-22 DIAGNOSIS — Z3A.31 31 WEEKS GESTATION OF PREGNANCY: ICD-10-CM

## 2024-05-22 LAB
SL AMB  POCT GLUCOSE, UA: NORMAL
SL AMB POCT URINE PROTEIN: NORMAL

## 2024-05-22 PROCEDURE — PNV: Performed by: PHYSICIAN ASSISTANT

## 2024-05-22 PROCEDURE — 81002 URINALYSIS NONAUTO W/O SCOPE: CPT | Performed by: PHYSICIAN ASSISTANT

## 2024-05-22 NOTE — PROGRESS NOTES
"Routine Prenatal Visit  [facility] - [place]  [address] [place] [address]    Assessment/Plan:  Contreras is a 39 y.o. year old  at 31w5d who presents for routine prenatal visit.     1. Multigravida of advanced maternal age in second trimester  2. 31 weeks gestation of pregnancy  Assessment & Plan:  Has growth ultrasound scheduled.   Continue  mg until 36 weeks.   Continue routine prenatal care.   Return to office for ob check in 2 weeks.     Orders:  -     POCT urine dip  3. Anxiety during pregnancy in second trimester, antepartum  4. Vanishing twin syndrome  5. Nausea and vomiting in pregnancy      Next OB Visit 2 weeks.    Subjective:     CC: Prenatal care    Contreras Rosenthal is a 39 y.o.  female who presents for routine prenatal care at 31w5d.  Pregnancy ROS: Good FM, No N/V, HA, cramping, VB, LOF, edema, domestic violence, or smoking. Tolerating PNV.        The following portions of the patient's history were reviewed and updated as appropriate: allergies, current medications, past family history, past medical history, obstetric history, gynecologic history, past social history, past surgical history and problem list.      Objective:  BP (!) 88/54 (BP Location: Left arm, Patient Position: Sitting, Cuff Size: Standard)   Ht 5' 4.5\" (1.638 m)   Wt 55.2 kg (121 lb 9.6 oz)   LMP 10/13/2023 (Exact Date)   BMI 20.55 kg/m²   Pregravid Weight/BMI: 46.3 kg (102 lb) (BMI 17.24)  Current Weight: 55.2 kg (121 lb 9.6 oz)   Total Weight Gain: 8.891 kg (19 lb 9.6 oz)   Pre-Renny Vitals      Flowsheet Row Most Recent Value   Prenatal Assessment    Fetal Heart Rate 142   Fundal Height (cm) 30 cm   Movement Present   Prenatal Vitals    Blood Pressure 88/54   Weight - Scale 55.2 kg (121 lb 9.6 oz)   Urine Albumin/Glucose    Dilation/Effacement/Station    Vaginal Drainage    Edema    LLE Edema None   RLE Edema None   Facial Edema None             General: Well appearing, no distress  Abdomen: " Soft, gravid, nontender  Fundal Height: Appropriate for gestational age.  Extremities: Warm and well perfused.  Non tender.

## 2024-05-22 NOTE — ASSESSMENT & PLAN NOTE
Has growth ultrasound scheduled.   Continue  mg until 36 weeks.   Continue routine prenatal care.   Return to office for ob check in 2 weeks.

## 2024-05-29 ENCOUNTER — ULTRASOUND (OUTPATIENT)
Dept: PERINATAL CARE | Facility: OTHER | Age: 39
End: 2024-05-29
Payer: COMMERCIAL

## 2024-05-29 VITALS
HEART RATE: 88 BPM | HEIGHT: 65 IN | BODY MASS INDEX: 20.49 KG/M2 | DIASTOLIC BLOOD PRESSURE: 50 MMHG | SYSTOLIC BLOOD PRESSURE: 98 MMHG | WEIGHT: 123 LBS

## 2024-05-29 DIAGNOSIS — Z3A.32 32 WEEKS GESTATION OF PREGNANCY: ICD-10-CM

## 2024-05-29 DIAGNOSIS — O09.523 MULTIGRAVIDA OF ADVANCED MATERNAL AGE IN THIRD TRIMESTER: Primary | ICD-10-CM

## 2024-05-29 PROCEDURE — 76816 OB US FOLLOW-UP PER FETUS: CPT | Performed by: OBSTETRICS & GYNECOLOGY

## 2024-05-29 PROCEDURE — 99212 OFFICE O/P EST SF 10 MIN: CPT | Performed by: OBSTETRICS & GYNECOLOGY

## 2024-05-29 NOTE — PROGRESS NOTES
The patient was seen today for an ultrasound.  Please see ultrasound report (located under Ob Procedures) for additional details.   Thank you very much for allowing us to participate in the care of this very nice patient.  Should you have any questions, please do not hesitate to contact me.     Bulmaro Moore MD FACOG  Attending Physician, Maternal-Fetal Medicine  [facility]

## 2024-06-05 ENCOUNTER — ROUTINE PRENATAL (OUTPATIENT)
Dept: OBGYN CLINIC | Facility: CLINIC | Age: 39
End: 2024-06-05
Payer: COMMERCIAL

## 2024-06-05 VITALS
WEIGHT: 121.4 LBS | DIASTOLIC BLOOD PRESSURE: 52 MMHG | HEIGHT: 65 IN | SYSTOLIC BLOOD PRESSURE: 100 MMHG | BODY MASS INDEX: 20.23 KG/M2

## 2024-06-05 DIAGNOSIS — Z3A.33 33 WEEKS GESTATION OF PREGNANCY: ICD-10-CM

## 2024-06-05 DIAGNOSIS — O09.523 MULTIGRAVIDA OF ADVANCED MATERNAL AGE IN THIRD TRIMESTER: Primary | ICD-10-CM

## 2024-06-05 DIAGNOSIS — O99.343 ANXIETY DURING PREGNANCY IN THIRD TRIMESTER, ANTEPARTUM: ICD-10-CM

## 2024-06-05 DIAGNOSIS — F41.9 ANXIETY DURING PREGNANCY IN THIRD TRIMESTER, ANTEPARTUM: ICD-10-CM

## 2024-06-05 LAB
SL AMB  POCT GLUCOSE, UA: NORMAL
SL AMB POCT URINE PROTEIN: NORMAL

## 2024-06-05 PROCEDURE — 81002 URINALYSIS NONAUTO W/O SCOPE: CPT | Performed by: OBSTETRICS & GYNECOLOGY

## 2024-06-05 PROCEDURE — PNV: Performed by: OBSTETRICS & GYNECOLOGY

## 2024-06-08 NOTE — PROGRESS NOTES
"Routine Prenatal Visit  [facility] - [address], [address], [place] [address]    Assessment/Plan:  Contreras is a 39 y.o. year old  at 33w5d who presents for routine prenatal visit.     1. Multigravida of advanced maternal age in third trimester  2. Anxiety during pregnancy in third trimester, antepartum  3. 33 weeks gestation of pregnancy  -     POCT urine dip        Subjective:     CC: Prenatal care    Contreras Rosenthal is a 39 y.o.  female who presents for routine prenatal care at 33w5d.  Pregnancy ROS: no leakage of fluid, pelvic pain, or vaginal bleeding.  normal fetal movement.    The following portions of the patient's history were reviewed and updated as appropriate: allergies, current medications, past family history, past medical history, obstetric history, gynecologic history, past social history, past surgical history and problem list.      Objective:  /52 (BP Location: Left arm, Patient Position: Sitting, Cuff Size: Standard)   Ht 5' 4.5\" (1.638 m)   Wt 55.1 kg (121 lb 6.4 oz)   LMP 10/13/2023 (Exact Date)   BMI 20.52 kg/m²   Pregravid Weight/BMI: 46.3 kg (102 lb) (BMI 17.24)  Current Weight: 55.1 kg (121 lb 6.4 oz)   Total Weight Gain: 8.8 kg (19 lb 6.4 oz)   Pre-Renny Vitals      Flowsheet Row Most Recent Value   Prenatal Assessment    Prenatal Vitals    Blood Pressure 100/52   Weight - Scale 55.1 kg (121 lb 6.4 oz)   Urine Albumin/Glucose    Dilation/Effacement/Station    Vaginal Drainage    Edema              General: Well appearing, no distress  Respiratory: Unlabored breathing  Cardiovascular: Regular rate.  Abdomen: Soft, gravid, nontender  Fundal Height: Appropriate for gestational age.  Extremities: Warm and well perfused.  Non tender.  "

## 2024-06-19 ENCOUNTER — ROUTINE PRENATAL (OUTPATIENT)
Dept: OBGYN CLINIC | Facility: CLINIC | Age: 39
End: 2024-06-19
Payer: COMMERCIAL

## 2024-06-19 VITALS — WEIGHT: 122 LBS | SYSTOLIC BLOOD PRESSURE: 102 MMHG | BODY MASS INDEX: 20.62 KG/M2 | DIASTOLIC BLOOD PRESSURE: 56 MMHG

## 2024-06-19 DIAGNOSIS — O99.343 ANXIETY DURING PREGNANCY IN THIRD TRIMESTER, ANTEPARTUM: ICD-10-CM

## 2024-06-19 DIAGNOSIS — O09.523 MULTIGRAVIDA OF ADVANCED MATERNAL AGE IN THIRD TRIMESTER: Primary | ICD-10-CM

## 2024-06-19 DIAGNOSIS — Z3A.35 35 WEEKS GESTATION OF PREGNANCY: ICD-10-CM

## 2024-06-19 DIAGNOSIS — O31.10X0 VANISHING TWIN SYNDROME: ICD-10-CM

## 2024-06-19 DIAGNOSIS — F41.9 ANXIETY DURING PREGNANCY IN THIRD TRIMESTER, ANTEPARTUM: ICD-10-CM

## 2024-06-19 LAB
SL AMB  POCT GLUCOSE, UA: NORMAL
SL AMB POCT URINE PROTEIN: NORMAL

## 2024-06-19 PROCEDURE — 81002 URINALYSIS NONAUTO W/O SCOPE: CPT | Performed by: STUDENT IN AN ORGANIZED HEALTH CARE EDUCATION/TRAINING PROGRAM

## 2024-06-19 PROCEDURE — PNV: Performed by: STUDENT IN AN ORGANIZED HEALTH CARE EDUCATION/TRAINING PROGRAM

## 2024-06-19 NOTE — ASSESSMENT & PLAN NOTE
- PTL/PPROM/Bleeding precautions given. Kick counts reviewed.  - Reviewed plan for collection of GBS swab at 36 weeks.  - Problem list updated, results console reviewed and updated with pertinent prenatal labs.  - PMH, PSH, medications reviewed and updated as needed  - Return to office in 1 wk(s) for routine prenatal care

## 2024-06-19 NOTE — PROGRESS NOTES
Routine Prenatal Visit  [facility] - [address], [address], [place] [address]    Assessment/Plan:  Contreras is a 39 y.o. year old  at 35w5d who presents for routine prenatal visit.     1. Multigravida of advanced maternal age in third trimester  Assessment & Plan:  - Contine  mg PO daily until 36 weeks for pre-eclampsia risk reduction.   2. Anxiety during pregnancy in third trimester, antepartum  3. Vanishing twin syndrome  4. 35 weeks gestation of pregnancy  Assessment & Plan:  - PTL/PPROM/Bleeding precautions given. Kick counts reviewed.  - Reviewed plan for collection of GBS swab at 36 weeks.  - Problem list updated, results console reviewed and updated with pertinent prenatal labs.  - PMH, PSH, medications reviewed and updated as needed  - Return to office in 1 wk(s) for routine prenatal care    Orders:  -     POCT urine dip        Subjective:   Contreras Rosenthal is a 39 y.o.  who presents for routine prenatal care at 35w5d.  Complaints today: None  LOF: No; VB: No; Contractions: No; FM: Present and normal    Objective:  /56 (BP Location: Right arm, Patient Position: Sitting, Cuff Size: Standard)   Wt 55.3 kg (122 lb)   LMP 10/13/2023 (Exact Date)   BMI 20.62 kg/m²     General: Well appearing, no distress  Respiratory: Unlabored breathing  Cardiovascular: Regular rate.  Abdomen: Soft, gravid, nontender  Extremities: Warm and well perfused.  Non tender.    Pregravid Weight/BMI: 46.3 kg (102 lb) (BMI 17.24)  Current Weight: 55.3 kg (122 lb)   Total Weight Gain: 9.072 kg (20 lb)     Pre-Renny Vitals      Flowsheet Row Most Recent Value   Prenatal Assessment    Fetal Heart Rate 140   Fundal Height (cm) 35 cm   Movement Present   Presentation Vertex   Prenatal Vitals    Blood Pressure 102/56   Weight - Scale 55.3 kg (122 lb)   Urine Albumin/Glucose    Dilation/Effacement/Station    Vaginal Drainage    Draining Fluid No   Edema    LLE Edema None   RLE Edema None    Facial Edema None             Johann Chauhan MD  6/19/2024 10:01 AM

## 2024-06-26 ENCOUNTER — ROUTINE PRENATAL (OUTPATIENT)
Dept: OBGYN CLINIC | Facility: CLINIC | Age: 39
End: 2024-06-26
Payer: COMMERCIAL

## 2024-06-26 VITALS
SYSTOLIC BLOOD PRESSURE: 92 MMHG | BODY MASS INDEX: 20.49 KG/M2 | HEIGHT: 65 IN | DIASTOLIC BLOOD PRESSURE: 56 MMHG | WEIGHT: 123 LBS

## 2024-06-26 DIAGNOSIS — Z36.85 ANTENATAL SCREENING FOR STREPTOCOCCUS B: ICD-10-CM

## 2024-06-26 DIAGNOSIS — O99.343 ANXIETY DURING PREGNANCY IN THIRD TRIMESTER, ANTEPARTUM: ICD-10-CM

## 2024-06-26 DIAGNOSIS — Z3A.36 36 WEEKS GESTATION OF PREGNANCY: ICD-10-CM

## 2024-06-26 DIAGNOSIS — O09.523 MULTIGRAVIDA OF ADVANCED MATERNAL AGE IN THIRD TRIMESTER: Primary | ICD-10-CM

## 2024-06-26 DIAGNOSIS — F41.9 ANXIETY DURING PREGNANCY IN THIRD TRIMESTER, ANTEPARTUM: ICD-10-CM

## 2024-06-26 DIAGNOSIS — Z36.89 ENCOUNTER FOR OTHER SPECIFIED ANTENATAL SCREENING: ICD-10-CM

## 2024-06-26 LAB
SL AMB  POCT GLUCOSE, UA: NORMAL
SL AMB POCT URINE PROTEIN: NORMAL

## 2024-06-26 PROCEDURE — PNV: Performed by: OBSTETRICS & GYNECOLOGY

## 2024-06-26 PROCEDURE — 81002 URINALYSIS NONAUTO W/O SCOPE: CPT | Performed by: OBSTETRICS & GYNECOLOGY

## 2024-06-26 NOTE — PROGRESS NOTES
"Routine Prenatal Visit  [facility] - [address], [address], [place] [address]    Assessment/Plan:  Contreras is a 39 y.o. year old  at 36w5d who presents for routine prenatal visit.     1. Multigravida of advanced maternal age in third trimester  2. Anxiety during pregnancy in third trimester, antepartum  3. 36 weeks gestation of pregnancy  -     POCT urine dip  4. Encounter for other specified  screening  -     Strep Gp B BONI+Rflx  5.  screening for streptococcus B  -     Strep Gp B BONI+Rflx        Subjective:     CC: Prenatal care    Contreras Rosenthal is a 39 y.o.  female who presents for routine prenatal care at 36w5d.  Pregnancy ROS: no leakage of fluid, pelvic pain, or vaginal bleeding.  normal fetal movement.    The following portions of the patient's history were reviewed and updated as appropriate: allergies, current medications, past family history, past medical history, obstetric history, gynecologic history, past social history, past surgical history and problem list.      Objective:  BP 92/56 (BP Location: Left arm, Patient Position: Sitting, Cuff Size: Standard)   Ht 5' 4.5\" (1.638 m)   Wt 55.8 kg (123 lb)   LMP 10/13/2023 (Exact Date)   BMI 20.79 kg/m²   Pregravid Weight/BMI: 46.3 kg (102 lb) (BMI 17.24)  Current Weight: 55.8 kg (123 lb)   Total Weight Gain: 9.526 kg (21 lb)   Pre-Renny Vitals      Flowsheet Row Most Recent Value   Prenatal Assessment    Fetal Heart Rate 140   Fundal Height (cm) 36 cm   Movement Present   Presentation Vertex   Prenatal Vitals    Blood Pressure 92/56   Weight - Scale 55.8 kg (123 lb)   Urine Albumin/Glucose    Dilation/Effacement/Station    Vaginal Drainage    Edema              General: Well appearing, no distress  Respiratory: Unlabored breathing  Cardiovascular: Regular rate.  Abdomen: Soft, gravid, nontender  Fundal Height: Appropriate for gestational age.  Extremities: Warm and well perfused.  Non tender.  "

## 2024-06-28 LAB — GP B STREP DNA SPEC QL NAA+PROBE: NEGATIVE

## 2024-07-02 ENCOUNTER — TELEPHONE (OUTPATIENT)
Age: 39
End: 2024-07-02

## 2024-07-02 ENCOUNTER — NURSE TRIAGE (OUTPATIENT)
Age: 39
End: 2024-07-02

## 2024-07-02 ENCOUNTER — HOSPITAL ENCOUNTER (INPATIENT)
Facility: HOSPITAL | Age: 39
LOS: 2 days | Discharge: HOME | End: 2024-07-04
Attending: STUDENT IN AN ORGANIZED HEALTH CARE EDUCATION/TRAINING PROGRAM | Admitting: STUDENT IN AN ORGANIZED HEALTH CARE EDUCATION/TRAINING PROGRAM
Payer: COMMERCIAL

## 2024-07-02 PROBLEM — Z3A.37 37 WEEKS GESTATION OF PREGNANCY: Status: ACTIVE | Noted: 2023-12-08

## 2024-07-02 LAB
A1 MICROGLOB PLACENTAL VAG QL: POSITIVE
ABO GROUP BLD: NORMAL
ABO GROUP BLD: NORMAL
BLD GP AB SCN SERPL QL: NEGATIVE
ERYTHROCYTE [DISTWIDTH] IN BLOOD BY AUTOMATED COUNT: 15.3 % (ref 11.6–15.1)
HCT VFR BLD AUTO: 37 % (ref 34.8–46.1)
HGB BLD-MCNC: 12.2 G/DL (ref 11.5–15.4)
HOLD SPECIMEN: NORMAL
MCH RBC QN AUTO: 30.7 PG (ref 26.8–34.3)
MCHC RBC AUTO-ENTMCNC: 33 G/DL (ref 31.4–37.4)
MCV RBC AUTO: 93 FL (ref 82–98)
PLATELET # BLD AUTO: 161 THOUSANDS/UL (ref 149–390)
PMV BLD AUTO: 9.4 FL (ref 8.9–12.7)
RBC # BLD AUTO: 3.98 MILLION/UL (ref 3.81–5.12)
RH BLD: POSITIVE
RH BLD: POSITIVE
SPECIMEN EXPIRATION DATE: NORMAL
WBC # BLD AUTO: 7.39 THOUSAND/UL (ref 4.31–10.16)

## 2024-07-02 PROCEDURE — 86901 BLOOD TYPING SEROLOGIC RH(D): CPT | Performed by: OBSTETRICS & GYNECOLOGY

## 2024-07-02 PROCEDURE — 86780 TREPONEMA PALLIDUM: CPT | Performed by: OBSTETRICS & GYNECOLOGY

## 2024-07-02 PROCEDURE — NC001 PR NO CHARGE: Performed by: OBSTETRICS & GYNECOLOGY

## 2024-07-02 PROCEDURE — 84112 EVAL AMNIOTIC FLUID PROTEIN: CPT | Performed by: OBSTETRICS & GYNECOLOGY

## 2024-07-02 PROCEDURE — 86900 BLOOD TYPING SEROLOGIC ABO: CPT | Performed by: OBSTETRICS & GYNECOLOGY

## 2024-07-02 PROCEDURE — 85027 COMPLETE CBC AUTOMATED: CPT | Performed by: OBSTETRICS & GYNECOLOGY

## 2024-07-02 PROCEDURE — 86850 RBC ANTIBODY SCREEN: CPT | Performed by: OBSTETRICS & GYNECOLOGY

## 2024-07-02 RX ORDER — SODIUM CHLORIDE, SODIUM LACTATE, POTASSIUM CHLORIDE, CALCIUM CHLORIDE 600; 310; 30; 20 MG/100ML; MG/100ML; MG/100ML; MG/100ML
125 INJECTION, SOLUTION INTRAVENOUS CONTINUOUS
Status: DISCONTINUED | OUTPATIENT
Start: 2024-07-02 | End: 2024-07-03

## 2024-07-02 RX ORDER — BUPIVACAINE HYDROCHLORIDE 2.5 MG/ML
30 INJECTION, SOLUTION EPIDURAL; INFILTRATION; INTRACAUDAL ONCE AS NEEDED
Status: DISCONTINUED | OUTPATIENT
Start: 2024-07-02 | End: 2024-07-03

## 2024-07-02 RX ORDER — ONDANSETRON 2 MG/ML
4 INJECTION INTRAMUSCULAR; INTRAVENOUS EVERY 6 HOURS PRN
Status: DISCONTINUED | OUTPATIENT
Start: 2024-07-02 | End: 2024-07-03

## 2024-07-02 RX ORDER — SULFASALAZINE 500 MG/1
500 TABLET, DELAYED RELEASE ORAL 4 TIMES DAILY
Status: DISCONTINUED | OUTPATIENT
Start: 2024-07-02 | End: 2024-07-04 | Stop reason: HOSPADM

## 2024-07-02 RX ORDER — TRAZODONE HYDROCHLORIDE 100 MG/1
100 TABLET ORAL
Status: DISCONTINUED | OUTPATIENT
Start: 2024-07-02 | End: 2024-07-04 | Stop reason: HOSPADM

## 2024-07-02 RX ORDER — OXYTOCIN/RINGER'S LACTATE 30/500 ML
1-30 PLASTIC BAG, INJECTION (ML) INTRAVENOUS
Status: DISCONTINUED | OUTPATIENT
Start: 2024-07-02 | End: 2024-07-03

## 2024-07-02 RX ADMIN — Medication 2 MILLI-UNITS/MIN: at 19:56

## 2024-07-02 RX ADMIN — SODIUM CHLORIDE, SODIUM LACTATE, POTASSIUM CHLORIDE, AND CALCIUM CHLORIDE 125 ML/HR: .6; .31; .03; .02 INJECTION, SOLUTION INTRAVENOUS at 21:48

## 2024-07-02 RX ADMIN — SODIUM CHLORIDE, SODIUM LACTATE, POTASSIUM CHLORIDE, AND CALCIUM CHLORIDE 1000 ML: .6; .31; .03; .02 INJECTION, SOLUTION INTRAVENOUS at 19:55

## 2024-07-02 NOTE — TELEPHONE ENCOUNTER
" 37 4/ , @ 4 AM felt warm trickle of fluid, put on mini pad, fluid is pale yellow. Per patient she noticed this happened a few more times today. Not sure if ROM or something else.  Has Occ irregular sukhwinder lanier contractions this past week, not uncomfortable.  Baby moving and met fetal kick count today.  Denies any vaginal bleeding or discharge. Denies any urinary symptoms.  Advised to go to triage for eval. Patient will go to  [facility] L&D in about 1.5 hours- waiting for  and .  Advised to immediately if bright red bleeding, gush of fluid or changes in baby's movements. Patient verbalized understanding.  [facility] Dr. Chauhan/  L&D charge    Reason for Disposition   Leakage of fluid from vagina    Answer Assessment - Initial Assessment Questions  1. ONSET: \"When did the symptoms begin?\"         4 AM warm trickle after using bathroom -put panty liner on   2. CONTRACTIONS: \"Are you having any contractions?\" If yes, ask: \"Describe the contractions that you are having.\" (e.g., duration, frequency, regularity, severity)      Slight backache started this week  3. ELINOR: \"What date are you expecting to deliver?\"      24  4. PARITY: \"Have you had a baby before?\" If Yes, ask: \"How long did the labor last?\"        5. FETAL MOVEMENT: \"Has the baby's movement decreased or changed significantly from normal?\"      Baby met fetal kick counts, feeling movements  6. OTHER SYMPTOMS: \"Do you have any other symptoms?\" (e.g., abdominal pain, vaginal bleeding, fever, hand/facial swelling)      Some ankle swelling ( not new)    Protocols used: Pregnancy - Rupture of Membranes-ADULT-[place]    "

## 2024-07-02 NOTE — H&P
Ob/Gyn History and Physical  Corinnesujit Rosenthal 39 y.o. female MRN: [id number]  Unit/Bed#: -01 Encounter: [id number]    A/P. 39 y.o.  at 37w4d, here with Term PRoM.  (1) Term PRoM.  RoM at 0400.  Equivocal findings on exam with no pooling or ferning but (+)nitrazine.  Amnisure positive.  Not in active labor; cervix 1cm dilated.  Ucs irregular.    Options discussed with her, including immediate induction or expectant management.  Recommended immediate induction, and reviewed data supporting a reduction in time to delivery, reduction in IAI, reduction in rate of NICU admission with immediate induction versus expectant management.  Discussed option of a limited (2-4hr) trial of expectant management, followed by induction if significant contractions do not occur in this time frame.    She elects immediate induction.    I discussed with Ms. Rosenthal in detail the risks, benefits, and alternatives to induction of labor in this setting.  The risks discussed included, but were not limited to: the risk of uterine rupture, with its attendant risks of hysterectomy and maternal and or fetal death; the risk of fetal hypoxic stress and permanent neurologic injury; as well as the risk of a prolonged induction ending in  section.  Following our discussion of risks, benefits, and alternatives, all of Ms. Rosenthal's questions were answered, and she stated her understanding, and her desire to assume these risks and to proceed with induction of labor.    Multiparous, with adequate pelvis.  Cephalic, EFW 3200grams.  Cervix 1/50/-1.  --> Admit.  Labs/toco/IVF.  --> Induction of labor with oxytocin.    (2) Ulcerative colitis.  Last flare remote.  Maintained on sulfasalazine.  --> Continue sulfasalazine.    (3) Anxiety.  --> Continue sertraline.    (4) Vanishing twin syndrome.  Live twin IUP at 7.5 weeks.  Vanishing twin diagnosed at 14 weeks gestational age.  Appropriate growth; last US EFW at the 60th %ile.  --> Care with  "third stage.  --> Placenta to pathology.    (5) Fetal status category I.    --> Continuous fetal monitoring.    Husam Mathews MD  07/02/24  Case discussed with on-call physician for Dr. Tien Garcia.    -------------------------------------------------------------------------------------------------------  CC/    \"I think I broke my water.\"    HPI/    At about 0400 awoke feeling wet.  Small amount of water on bedsheets.  No continued trickle.  Throughout the day, has periodically felt wet.    Occasional contraction.  No VB.  (+)FM.  No F/Ch.    Pregnancy complications/      Patient Active Problem List   Diagnosis    Multigravida of advanced maternal age in third trimester    Anxiety during pregnancy in third trimester, antepartum    History of loop electrosurgical excision procedure (LEEP)    Family history of breast cancer in first degree relative    Ulcerative colitis (HCC)    Anxiety    Pelvic pain in pregnancy    37 weeks gestation of pregnancy    Vanishing twin syndrome    Nausea and vomiting in pregnancy    Full-term premature rupture of membranes with onset of labor within 24 hours of rupture       Allergies/      Allergies as of 07/02/2024 - Reviewed 07/02/2024   Allergen Reaction Noted    Amoxicillin Other (See Comments), Diarrhea, GI Bleeding, and GI Intolerance 04/09/2021       Rx/      No current facility-administered medications on file prior to encounter.     Current Outpatient Medications on File Prior to Encounter   Medication Sig Dispense Refill    Prenatal Vit-Fe Fumarate-FA (PRENATAL VITAMIN PO) Take by mouth      sertraline (ZOLOFT) 100 mg tablet Take 50 mg by mouth daily      sulfaSALAzine (AZULFIDINE-EN) 500 mg EC tablet Take 1 tablet (500 mg total) by mouth 4 (four) times a day 120 tablet 5    traZODone (DESYREL) 100 mg tablet Take 100 mg by mouth if needed         PMH/      Past Medical History:   Diagnosis Date    Anemia     Anxiety     managed by PCP    C. difficile " colitis 2016    during pregnancy    Irritable bowel syndrome     LGSIL on Pap smear of cervix     had leep 2016    Ulcerative colitis (HCC)     Dr. Chowdary       PSH/      Past Surgical History:   Procedure Laterality Date    CERVICAL BIOPSY  W/ LOOP ELECTRODE EXCISION      COLONOSCOPY      COLONOSCOPY  2022    mild pancolitis    WISDOM TOOTH EXTRACTION         ObHx/    , expecting a baby girl     OB History    Para Term  AB Living   5 3 3 0 1 3   SAB IAB Ectopic Multiple Live Births   1 0 0 0 3      # Outcome Date GA Lbr Steven/2nd Weight Sex Type Anes PTL Lv   5 Current            4 Term 21 38w2d  2839 g (6 lb 4.1 oz) F Vag-Spont   KENDALL      Name: Rere   3 SAB 2020           2 Term 19 38w1d  2637 g (5 lb 13 oz) F Vag-Spont  N KENDALL      Complications: Hyperemesis gravidarum   1 Term 17 39w2d  2835 g (6 lb 4 oz) M Vag-Vacuum  N KENDALL      Obstetric Comments   Menarche age 15       GynHx/    There is no history of sexually-transmitted infections.    FH/      Family History   Problem Relation Age of Onset    Heart disease Father     Hypertension Father     Thyroid disease Father     Anemia Sister     Depression Sister     Inflammatory bowel disease Sister     Breast cancer Sister     Irritable bowel syndrome Sister     Ulcerative colitis Sister     No Known Problems Sister     Breast cancer Sister 42    No Known Problems Sister     Colon polyps Brother     Depression Brother     No Known Problems Brother     No Known Problems Daughter     No Known Problems Son     Colon cancer Neg Hx     Ovarian cancer Neg Hx     Uterine cancer Neg Hx     Prostate cancer Neg Hx     Pancreatic cancer Neg Hx        SH/    She is  to [place].    She is a homemaker.    She does not use tobacco, alcohol, or illicit drugs.        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   Tobacco Use    Smoking status: Never     Passive exposure: Never    Smokeless tobacco: Never   Vaping Use    Vaping status: Never Used   Substance and Sexual Activity    Alcohol use: Never    Drug use: Never     Comment: No use    Sexual activity: Yes     Partners: Male     Birth control/protection: None   Other Topics Concern    Not on file   Social History Narrative    Exercises weekly     Social Determinants of Health     Financial Resource Strain: Not on file   Food Insecurity: No Food Insecurity (5/31/2024)    Hunger Vital Sign     Worried About Running Out of Food in the Last Year: Never true     Ran Out of Food in the Last Year: Never true   Transportation Needs: No Transportation Needs (5/31/2024)    PRAPARE - Transportation     Lack of Transportation (Medical): No     Lack of Transportation (Non-Medical): No   Physical Activity: Not on file   Stress: Not on file   Social Connections: Not on file   Intimate Partner Violence: Not on file   Housing Stability: Low Risk  (5/31/2024)    Housing Stability Vital Sign     Unable to Pay for Housing in the Last Year: No     Number of Times Moved in the Last Year: 0     Homeless in the Last Year: No       RoS/    Constitutional: Negative    CV: Negative    Pulm: Negative    GI: Negative    Urinary: Negative    Neuro: Negative    Musculoskeletal: Negative    O/  /60 (BP Location: Right arm)   Pulse 86   Temp 98.4 °F (36.9 °C) (Oral)   Resp 18   LMP 10/13/2023 (Exact Date)     Alert, comfortable, no acute distress    Regular rate and rhythm    Clear to auscultation bilaterally    Abdomen soft, nontender, nondistended.    Fundus nontender, size consistent with dates      Cephalic      EFW 3200    Gyn/      Normal female external genitalia.      Vault well-estrogenized, well-supported.      Pelvis adequate/gynecoid.         Cervix:           Dilation: 1cm         Effacement: 50%         Station: -1  Consistency: Medium         Position: Middle   Presentation:      FHR: 115 moderate variability.  (+) accels, no decels.    [place]:  q 4 minutes    Ultrasound:       Single live, active IUP; cephalic     AFSHAN 11cm    Prenatal labs/  Lab Results   Component Value Date    ABO A 12/27/2023    RH Positive 12/27/2023    ABS Normal 12/27/2023    HGB 11.4 04/29/2024     04/29/2024    EXTRUBELIGGQ Immune 12/27/2023    RPR Non Reactive 04/29/2024    HEPBSAG Negative 12/27/2023    HEPCAB Non Reactive 12/27/2023    HIVAGAB Non-Reactive 12/27/2023    GC Negative 12/08/2023    [id number] 133 04/29/2024       GBS:  Negative

## 2024-07-03 LAB — TREPONEMA PALLIDUM IGG+IGM AB [PRESENCE] IN SERUM OR PLASMA BY IMMUNOASSAY: NORMAL

## 2024-07-03 PROCEDURE — 88307 TISSUE EXAM BY PATHOLOGIST: CPT | Performed by: PATHOLOGY

## 2024-07-03 PROCEDURE — 99212 OFFICE O/P EST SF 10 MIN: CPT

## 2024-07-03 PROCEDURE — 4A1HXCZ MONITORING OF PRODUCTS OF CONCEPTION, CARDIAC RATE, EXTERNAL APPROACH: ICD-10-PCS | Performed by: OBSTETRICS & GYNECOLOGY

## 2024-07-03 PROCEDURE — 59400 OBSTETRICAL CARE: CPT | Performed by: OBSTETRICS & GYNECOLOGY

## 2024-07-03 PROCEDURE — NC001 PR NO CHARGE: Performed by: OBSTETRICS & GYNECOLOGY

## 2024-07-03 PROCEDURE — 3E033VJ INTRODUCTION OF OTHER HORMONE INTO PERIPHERAL VEIN, PERCUTANEOUS APPROACH: ICD-10-PCS | Performed by: OBSTETRICS & GYNECOLOGY

## 2024-07-03 RX ORDER — OXYCODONE HYDROCHLORIDE 5 MG/1
5 TABLET ORAL
Status: DISCONTINUED | OUTPATIENT
Start: 2024-07-03 | End: 2024-07-04 | Stop reason: HOSPADM

## 2024-07-03 RX ORDER — DOCUSATE SODIUM 100 MG/1
100 CAPSULE, LIQUID FILLED ORAL 2 TIMES DAILY PRN
Status: DISCONTINUED | OUTPATIENT
Start: 2024-07-03 | End: 2024-07-04 | Stop reason: HOSPADM

## 2024-07-03 RX ORDER — BENZOCAINE/MENTHOL 6 MG-10 MG
1 LOZENGE MUCOUS MEMBRANE DAILY PRN
Status: DISCONTINUED | OUTPATIENT
Start: 2024-07-03 | End: 2024-07-04 | Stop reason: HOSPADM

## 2024-07-03 RX ORDER — LIDOCAINE HYDROCHLORIDE 10 MG/ML
INJECTION, SOLUTION EPIDURAL; INFILTRATION; INTRACAUDAL; PERINEURAL
Status: DISPENSED
Start: 2024-07-03 | End: 2024-07-03

## 2024-07-03 RX ORDER — ACETAMINOPHEN 325 MG/1
650 TABLET ORAL EVERY 4 HOURS PRN
Status: DISCONTINUED | OUTPATIENT
Start: 2024-07-03 | End: 2024-07-04 | Stop reason: HOSPADM

## 2024-07-03 RX ORDER — DIPHENHYDRAMINE HCL 25 MG
25 TABLET ORAL EVERY 6 HOURS PRN
Status: DISCONTINUED | OUTPATIENT
Start: 2024-07-03 | End: 2024-07-04 | Stop reason: HOSPADM

## 2024-07-03 RX ORDER — ONDANSETRON 2 MG/ML
4 INJECTION INTRAMUSCULAR; INTRAVENOUS EVERY 8 HOURS PRN
Status: DISCONTINUED | OUTPATIENT
Start: 2024-07-03 | End: 2024-07-04 | Stop reason: HOSPADM

## 2024-07-03 RX ORDER — NALBUPHINE HYDROCHLORIDE 10 MG/ML
10 INJECTION, SOLUTION INTRAMUSCULAR; INTRAVENOUS; SUBCUTANEOUS
Status: DISCONTINUED | OUTPATIENT
Start: 2024-07-03 | End: 2024-07-03

## 2024-07-03 RX ORDER — CALCIUM CARBONATE 500 MG/1
1000 TABLET, CHEWABLE ORAL DAILY PRN
Status: DISCONTINUED | OUTPATIENT
Start: 2024-07-03 | End: 2024-07-04 | Stop reason: HOSPADM

## 2024-07-03 RX ORDER — OXYTOCIN/RINGER'S LACTATE 30/500 ML
250 PLASTIC BAG, INJECTION (ML) INTRAVENOUS ONCE
Status: DISCONTINUED | OUTPATIENT
Start: 2024-07-03 | End: 2024-07-04 | Stop reason: HOSPADM

## 2024-07-03 RX ORDER — IBUPROFEN 600 MG/1
600 TABLET ORAL EVERY 6 HOURS
Status: DISCONTINUED | OUTPATIENT
Start: 2024-07-03 | End: 2024-07-04 | Stop reason: HOSPADM

## 2024-07-03 RX ADMIN — ACETAMINOPHEN 650 MG: 325 TABLET, FILM COATED ORAL at 03:21

## 2024-07-03 RX ADMIN — SULFASALAZINE 500 MG: 500 TABLET, DELAYED RELEASE ORAL at 17:50

## 2024-07-03 RX ADMIN — NALBUPHINE HYDROCHLORIDE 10 MG: 10 INJECTION, SOLUTION INTRAMUSCULAR; INTRAVENOUS; SUBCUTANEOUS at 02:19

## 2024-07-03 RX ADMIN — SULFASALAZINE 500 MG: 500 TABLET, DELAYED RELEASE ORAL at 13:21

## 2024-07-03 RX ADMIN — SULFASALAZINE 500 MG: 500 TABLET, DELAYED RELEASE ORAL at 08:37

## 2024-07-03 RX ADMIN — ONDANSETRON 4 MG: 2 INJECTION INTRAMUSCULAR; INTRAVENOUS at 02:02

## 2024-07-03 RX ADMIN — DOCUSATE SODIUM 100 MG: 100 CAPSULE, LIQUID FILLED ORAL at 17:50

## 2024-07-03 RX ADMIN — BENZOCAINE AND LEVOMENTHOL 1 APPLICATION: 200; 5 SPRAY TOPICAL at 06:38

## 2024-07-03 RX ADMIN — SULFASALAZINE 500 MG: 500 TABLET, DELAYED RELEASE ORAL at 22:12

## 2024-07-03 RX ADMIN — ACETAMINOPHEN 650 MG: 325 TABLET, FILM COATED ORAL at 20:55

## 2024-07-03 RX ADMIN — SERTRALINE HYDROCHLORIDE 50 MG: 50 TABLET ORAL at 08:37

## 2024-07-03 RX ADMIN — DOCUSATE SODIUM 100 MG: 100 CAPSULE, LIQUID FILLED ORAL at 08:37

## 2024-07-03 NOTE — PLAN OF CARE
Problem: POSTPARTUM  Goal: Experiences normal postpartum course  Description: INTERVENTIONS:  - Monitor maternal vital signs  - Assess uterine involution and lochia  Outcome: Progressing  Goal: Appropriate maternal -  bonding  Description: INTERVENTIONS:  - Identify family support  - Assess for appropriate maternal/infant bonding   -Encourage maternal/infant bonding opportunities  - Referral to  or  as needed  Outcome: Progressing  Goal: Establishment of infant feeding pattern  Description: INTERVENTIONS:  - Assess breast/bottle feeding  - Refer to lactation as needed  Outcome: Progressing  Goal: Incision(s), wounds(s) or drain site(s) healing without S/S of infection  Description: INTERVENTIONS  - Assess and document dressing, incision, wound bed, drain sites and surrounding tissue  - Provide patient and family education    Outcome: Progressing

## 2024-07-03 NOTE — L&D DELIVERY NOTE
Delivery Note  Contreras Rosenthal; 39 y.o. female; MRN: [id number]  Unit/Bed#: -01; Encounter: [id number]    Time of delivery: 7/3/2024  2:24 AM    Preop Diagnoses:    (1) Pregnancy at 37w5d gestational age    (2) Vanishing twin     Postop diagnoses:    (1) 3039 g (6 lb 11.2 oz) female , apgars 9  / 9 , born 7/3/2024  2:24 AM    Procedure:  Delivery - Vaginal, Spontaneous    Obstetrician:  YANG MATHEWS  Anesthesia:  None  QBL:    100cc  Specimens:    (1) Cord blood    (2) Cord gases    (3) Placenta    Findings:    (1) 3039 g (6 lb 11.2 oz) female , apgars 9  / 9     (2) Intact placenta, cord with 3 vessels; small area of thickening on membranous side of placenta, c/w vanishing twin.     Complications:  None.  Dispo:  Stable.    Procedure in detail:    Came to room.  Complete and .  Pushed and delivered the fetal vertex Right Occiput Anterior.  As I was donning gloves, she pushed and the anterior shoulder delivered easily; the rest of the infant followed without difficulty and delivered in the bed.  The infant was noted to be a vigorous 3039 g (6 lb 11.2 oz) baby girl.  Transferred immediately to the maternal abdomen.  After 30 seconds, the cord was clamped x 2 and cut.      Cord gases were collected.  Cord blood was collected.    Third stage of labor resulted in the Spontaneous delivery of the placenta, which was inspected and noted to be Intact.  There was a small area of thickening on the membranous side of the placenta, consistent with history of vanishing twin.  The cord was noted to have 3 vessels.  The placenta was sent for pathologic examination. Uterine massage was performed, after which the uterus was noted to be firm.    The birth canal and perineum were inspected. There was a small periclitoral abrasion which was hemostatic, and was not repaired.    Ms. Rosenthal did well, and following the procedure was in stable condition.    Yang Mathews MD

## 2024-07-03 NOTE — PLAN OF CARE
Problem: Knowledge Deficit  Goal: Verbalizes understanding of labor plan  Description: Assess patient/family/caregiver's baseline knowledge level and ability to understand information.  Provide education via patient/family/caregiver's preferred learning method at appropriate level of understanding.     1. Provide teaching at level of understanding.  2. Provide teaching via preferred learning method(s).  Outcome: Progressing     Problem: Labor & Delivery  Goal: Manages discomfort  Description: Assess and monitor for signs and symptoms of discomfort.  Assess patient's pain level regularly and per hospital policy.  Administer medications as ordered. Support use of nonpharmacological methods to help control pain such as distraction, imagery, relaxation, and application of heat and cold.  Collaborate with interdisciplinary team and patient to determine appropriate pain management plan.    1. Include patient in decisions related to comfort.  2. Offer non-pharmacological pain management interventions.  3. Report ineffective pain management to physician.  Outcome: Progressing     Problem: ANTEPARTUM  Goal: Maintain pregnancy as long as maternal and/or fetal condition is stable  Description: INTERVENTIONS:  - Maternal surveillance  - Fetal surveillance  - Monitor uterine activity  - Medications as ordered  - Bedrest  Outcome: Progressing     Problem: BIRTH - VAGINAL/ SECTION  Goal: Fetal and maternal status remain reassuring during the birth process  Description: INTERVENTIONS:  - Monitor vital signs  - Monitor fetal heart rate  - Monitor uterine activity  - Monitor labor progression (vaginal delivery)  - DVT prophylaxis  - Antibiotic prophylaxis  Outcome: Progressing  Goal: Emotionally satisfying birthing experience for mother/fetus  Description: Interventions:  - Assess, plan, implement and evaluate the nursing care given to the patient in labor  - Advocate the philosophy that each childbirth experience is a  unique experience and support the family's chosen level of involvement and control during the labor process   - Actively participate in both the patient's and family's teaching of the birth process  - Consider cultural, Jainism and age-specific factors and plan care for the patient in labor  Outcome: Progressing

## 2024-07-03 NOTE — LACTATION NOTE
"This note was copied from a baby's chart.  Met with mother to discuss feeding plan. Mother is breastfeeding and discussed that baby fed very well after birth and is sleepy.    The Ready, Set, Baby Booklet was discussed. Discussed importance of skin to skin to help baby awaken for breastfeeding, to help with milk production as well as stabilize temperature, blood sugars, decrease pain, promote relaxation, and calm the baby as well as for bonding that father may do as well. Discussed tummy size progression as milk production increases to meet the nutritional/growing needs of the baby. Discussed alternative feeding methods as a manner to provide baby with additional colostrum/breast milk if baby is sleepy and/or unable to breastfeed directly to help protect the milk supply and preserve latching abilities at the breast. Mother was encouraged to hand express after feedings to provide \"dessert\" and when sleepy to hand express to provide \"snacks\" which could help baby to awaken for a feeding.    Discussed “Second Night Syndrome” explaining how baby’s cluster feeds to meet growing needs. Growth spurts periods were discussed within the first year and how cluster feeding helps boost milk supply.    Explained feeding cues and fullness cues as well as importance of obtaining a deep latch for effective milk removal and proper positioning (tummy to tummy, at level, nose to nipple, bring chin to breast first and bringing baby to breast) with ear, shoulder, and hip alignment. Demonstrated how to hold, compress and perform hand expression. Mother expressed 7 drops that were provided to baby via finger feeding. Baby brought tongue past lips, cupped well and had a strong suckle. Tightness noted with jaw. Exercises completed.    Addressed breast pump needs and mother discussed that she has a double breast pump for home use.    Mother was made aware of how to communicate with lactation and encouraged to reach out for a latch assessment, " continued support and/or questions that arise.

## 2024-07-03 NOTE — PROGRESS NOTES
"Ob Labor Progress Note  Contreras Rosenthal 39 y.o. female MRN: [id number]  Unit/Bed#: -01 Encounter: [id number]    A/P.  39 y.o.  at 37w4d here with Term PRoM.  (1) Term PRoM.  RoM 24 at 0400.  Oxytocin at 6mu/min.  Cervix now 2-3/50/-1.     --> Continue oxytocin.    (2) Fetal status category I.    --> Continuous fetal monitoring.    Husam Mathews MD  24  -------------------------------------------------------------------------------------    Subjective/    Comfortable.    Objective/  Blood pressure 99/60, pulse 86, temperature 98.1 °F (36.7 °C), temperature source Oral, resp. rate 16, height 5' 4.5\" (1.638 m), weight 55.3 kg (122 lb), last menstrual period 10/13/2023, not currently breastfeeding.    No intake or output data in the 24 hours ending 24 6262      Physical Exam/      General:  Alert, comfortable, NAD      Fundus nontender      Cervix:          2-3 / 50 / -1,            Medium consistency / Middle position      FHR: 120 moderate variability (+)accels, no decels    [place]: q3\"      Labs/            Lab Results   Component Value Date    WBC 7.39 2024    HGB 12.2 2024    HCT 37.0 2024    MCV 93 2024     2024         "

## 2024-07-03 NOTE — PLAN OF CARE
Problem: Knowledge Deficit  Goal: Verbalizes understanding of labor plan  Description: Assess patient/family/caregiver's baseline knowledge level and ability to understand information.  Provide education via patient/family/caregiver's preferred learning method at appropriate level of understanding.     1. Provide teaching at level of understanding.  2. Provide teaching via preferred learning method(s).  7/3/2024 0501 by Vidhi Moncada RN  Outcome: Completed  2024 by Vidhi Moncada RN  Outcome: Progressing     Problem: Labor & Delivery  Goal: Manages discomfort  Description: Assess and monitor for signs and symptoms of discomfort.  Assess patient's pain level regularly and per hospital policy.  Administer medications as ordered. Support use of nonpharmacological methods to help control pain such as distraction, imagery, relaxation, and application of heat and cold.  Collaborate with interdisciplinary team and patient to determine appropriate pain management plan.    1. Include patient in decisions related to comfort.  2. Offer non-pharmacological pain management interventions.  3. Report ineffective pain management to physician.  7/3/2024 0501 by Vidhi Moncada RN  Outcome: Completed  2024 by Vidhi Moncada RN  Outcome: Progressing  Goal: Patient vital signs are stable  Description: 1. Assess vital signs - vaginal delivery.  Outcome: Completed     Problem: ANTEPARTUM  Goal: Maintain pregnancy as long as maternal and/or fetal condition is stable  Description: INTERVENTIONS:  - Maternal surveillance  - Fetal surveillance  - Monitor uterine activity  - Medications as ordered  - Bedrest  7/3/2024 0501 by Vidhi Monacda RN  Outcome: Completed  2024 by Vidhi Moncada RN  Outcome: Progressing     Problem: BIRTH - VAGINAL/ SECTION  Goal: Fetal and maternal status remain reassuring during the birth process  Description: INTERVENTIONS:  - Monitor vital signs  -  Monitor fetal heart rate  - Monitor uterine activity  - Monitor labor progression (vaginal delivery)  - DVT prophylaxis  - Antibiotic prophylaxis  7/3/2024 0501 by Vidhi Moncada RN  Outcome: Completed  7/2/2024 2219 by Vidhi Moncada RN  Outcome: Progressing  Goal: Emotionally satisfying birthing experience for mother/fetus  Description: Interventions:  - Assess, plan, implement and evaluate the nursing care given to the patient in labor  - Advocate the philosophy that each childbirth experience is a unique experience and support the family's chosen level of involvement and control during the labor process   - Actively participate in both the patient's and family's teaching of the birth process  - Consider cultural, Judaism and age-specific factors and plan care for the patient in labor  7/3/2024 0501 by Vidhi Moncada RN  Outcome: Completed  7/2/2024 2219 by Vidhi Moncada RN  Outcome: Progressing

## 2024-07-03 NOTE — PROGRESS NOTES
"Ob Labor Progress Note  Contreras Rosenthal 39 y.o. female MRN: [id number]  Unit/Bed#: -01 Encounter: [id number]    A/P.  39 y.o.  at 37w5d here with Term PRoM.  (1) Term PRoM.  RoM 24 at 0400.  Oxytocin at 6mu/min.  Cervix now 3/70/-1.     --> Continue oxytocin.     (2) Fetal status category I.    --> Continuous fetal monitoring.    Husam Mathews MD  24  -------------------------------------------------------------------------------------    Subjective/    Pressure.    Objective/  Blood pressure 99/61, pulse 86, temperature 98.1 °F (36.7 °C), temperature source Oral, resp. rate 16, height 5' 4.5\" (1.638 m), weight 55.3 kg (122 lb), last menstrual period 10/13/2023, not currently breastfeeding.    No intake or output data in the 24 hours ending 24 0126      Physical Exam/      General:  Alert, comfortable, NAD      Fundus nontender      Cervix:          3 / 70 / -1,            Medium consistency / Posterior position      FHR: 120 moderate variability (+)accels, no decels    [place]: q2.5\"  "

## 2024-07-03 NOTE — PLAN OF CARE
Problem: PAIN -   Goal: Displays adequate comfort level or baseline comfort level  Description: INTERVENTIONS:  - Perform pain scoring using age-appropriate tool with hands-on care as needed.  Notify physician/AP of high pain scores not responsive to comfort measures  - Administer analgesics based on type and severity of pain and evaluate response  - Sucrose analgesia per protocol for brief minor painful procedures  - Teach parents interventions for comforting infant  Outcome: Progressing     Problem: THERMOREGULATION - PEDIATRICS  Goal: Maintains normal body temperature  Description: Interventions:  - Monitor temperature (axillary for Newborns) as ordered  - Monitor for signs of hypothermia or hyperthermia  - Provide thermal support measures  - Wean to open crib when appropriate  Outcome: Progressing     Problem: INFECTION -   Goal: No evidence of infection  Description: INTERVENTIONS:  - Instruct family/visitors to use good hand hygiene technique  - Identify and instruct in appropriate isolation precautions for identified infection/condition  - Change incubator every 2 weeks or as needed.  - Monitor for symptoms of infection  - Monitor surgical sites and insertion sites for all indwelling lines, tubes, and drains for drainage, redness, or edema.  - Monitor endotracheal and nasal secretions for changes in amount and color  - Monitor culture and CBC results  - Administer antibiotics as ordered.  Monitor drug levels  Outcome: Progressing     Problem: RISK FOR INFECTION (RISK FACTORS FOR MATERNAL CHORIOAMNIOITIS - )  Goal: No evidence of infection  Description: INTERVENTIONS:  - Instruct family/visitors to use good hand hygiene technique  - Monitor for symptoms of infection  - Monitor culture and CBC results  - Administer antibiotics as ordered.  Monitor drug levels  Outcome: Progressing     Problem: SAFETY -   Goal: Patient will remain free from falls  Description: INTERVENTIONS:  -  Instruct family/caregiver on patient safety  - Keep incubator doors and portholes closed when unattended  - Keep radiant warmer side rails and crib rails up when unattended  - Based on caregiver fall risk screen, instruct family/caregiver to ask for assistance with transferring infant if caregiver noted to have fall risk factors  Outcome: Progressing     Problem: Knowledge Deficit  Goal: Patient/family/caregiver demonstrates understanding of disease process, treatment plan, medications, and discharge instructions  Description: Complete learning assessment and assess knowledge base.  Interventions:  - Provide teaching at level of understanding  - Provide teaching via preferred learning methods  Outcome: Progressing  Goal: Infant caregiver verbalizes understanding of benefits of skin-to-skin with healthy   Description: Prior to delivery, educate patient regarding skin-to-skin practice and its benefits  Initiate immediate and uninterrupted skin-to-skin contact after birth until breastfeeding is initiated or a minimum of one hour  Encourage continued skin-to-skin contact throughout the post partum stay    Outcome: Progressing  Goal: Infant caregiver verbalizes understanding of benefits and management of breastfeeding their healthy   Description: Help initiate breastfeeding within one hour of birth  Educate/assist with breastfeeding positioning and latch  Educate on safe positioning and to monitor their  for safety  Educate on how to maintain lactation even if they are  from their   Educate/initiate pumping for a mom with a baby in the NICU within 6 hours after birth  Give infants no food or drink other than breast milk unless medically indicated  Educate on feeding cues and encourage breastfeeding on demand    Outcome: Progressing  Goal: Infant caregiver verbalizes understanding of benefits to rooming-in with their healthy   Description: Promote rooming in 23 out of 24 hours  per day  Educate on benefits to rooming-in  Provide  care in room with parents as long as infant and mother condition allow    Outcome: Progressing  Goal: Provide formula feeding instructions and preparation information to caregivers who do not wish to breastfeed their   Description: Provide one on one information on frequency, amount, and burping for formula feeding caregivers throughout their stay and at discharge.  Provide written information/video on formula preparation.    Outcome: Progressing  Goal: Infant caregiver verbalizes understanding of support and resources for follow up after discharge  Description: Provide individual discharge education on when to call the doctor.  Provide resources and contact information for post-discharge support.    Outcome: Progressing      Additional Safety/Bands:

## 2024-07-04 ENCOUNTER — TELEPHONE (OUTPATIENT)
Dept: LABOR AND DELIVERY | Facility: HOSPITAL | Age: 39
End: 2024-07-04

## 2024-07-04 VITALS
HEIGHT: 65 IN | TEMPERATURE: 97.7 F | DIASTOLIC BLOOD PRESSURE: 68 MMHG | WEIGHT: 122 LBS | HEART RATE: 60 BPM | BODY MASS INDEX: 20.33 KG/M2 | RESPIRATION RATE: 16 BRPM | OXYGEN SATURATION: 100 % | SYSTOLIC BLOOD PRESSURE: 96 MMHG

## 2024-07-04 PROBLEM — O99.119 THROMBOCYTOPENIA AFFECTING PREGNANCY (HCC): Status: ACTIVE | Noted: 2024-07-04

## 2024-07-04 PROBLEM — D69.6 THROMBOCYTOPENIA AFFECTING PREGNANCY (HCC): Status: ACTIVE | Noted: 2024-07-04

## 2024-07-04 LAB
ERYTHROCYTE [DISTWIDTH] IN BLOOD BY AUTOMATED COUNT: 15.1 % (ref 11.6–15.1)
HCT VFR BLD AUTO: 39.6 % (ref 34.8–46.1)
HGB BLD-MCNC: 12.6 G/DL (ref 11.5–15.4)
MCH RBC QN AUTO: 30.4 PG (ref 26.8–34.3)
MCHC RBC AUTO-ENTMCNC: 31.8 G/DL (ref 31.4–37.4)
MCV RBC AUTO: 95 FL (ref 82–98)
PLATELET # BLD AUTO: 114 THOUSANDS/UL (ref 149–390)
PMV BLD AUTO: 9.6 FL (ref 8.9–12.7)
RBC # BLD AUTO: 4.15 MILLION/UL (ref 3.81–5.12)
WBC # BLD AUTO: 6.72 THOUSAND/UL (ref 4.31–10.16)

## 2024-07-04 PROCEDURE — 85027 COMPLETE CBC AUTOMATED: CPT | Performed by: OBSTETRICS & GYNECOLOGY

## 2024-07-04 PROCEDURE — NC001 PR NO CHARGE: Performed by: STUDENT IN AN ORGANIZED HEALTH CARE EDUCATION/TRAINING PROGRAM

## 2024-07-04 PROCEDURE — 99024 POSTOP FOLLOW-UP VISIT: CPT | Performed by: STUDENT IN AN ORGANIZED HEALTH CARE EDUCATION/TRAINING PROGRAM

## 2024-07-04 RX ORDER — DOCUSATE SODIUM 100 MG/1
100 CAPSULE, LIQUID FILLED ORAL 2 TIMES DAILY PRN
Start: 2024-07-04

## 2024-07-04 RX ORDER — IBUPROFEN 200 MG
600 TABLET ORAL EVERY 6 HOURS
Start: 2024-07-04

## 2024-07-04 RX ORDER — ACETAMINOPHEN 325 MG/1
650 TABLET ORAL EVERY 4 HOURS PRN
Start: 2024-07-04

## 2024-07-04 RX ADMIN — SERTRALINE HYDROCHLORIDE 50 MG: 50 TABLET ORAL at 09:39

## 2024-07-04 RX ADMIN — SULFASALAZINE 500 MG: 500 TABLET, DELAYED RELEASE ORAL at 09:39

## 2024-07-04 NOTE — LACTATION NOTE
This note was copied from a baby's chart.  Met with parents to follow up and discuss the Breastfeeding Discharge Booklet.     Mother reports that baby has been feeding well and cluster fed during the night.    Baby is currently at a 5.9% weight loss, having appropriate output and 24 hour bilirubin was low.    Discussed the importance of ensuring that baby feeds 8-12x in 24 hours and that baby has 6 wet diapers or more that are becoming more dilute as well as soiled diapers that are transitioning demonstrated by color change from meconium to a yellow/gold seedy loose stool by day 5.    Mother was given resources to look up medications to ensure they are safe with breastfeeding, by communicating with the [facility], [facility], [facility] as well as using [email or web address].    Mother is aware of engorgement time frame (when mature milk comes in) and management as well as how to deal with conditions that may occur while breastfeeding (plugged ducts, milk blebs and mastitis) and when is appropriate to communicate with her OB/GYN and/or a lactation consultant.    Mother is comfortable with how to set up a pump, how to cycle (stimulation vs expression phases during a pumping session), importance of flange fit and trying different sizes to ensure best fit, milk storage and how to properly clean parts. Discussed handouts for tips on pumping when returning to work and paced bottle feeding.    Discussed community resources for continued support in breastfeeding once discharged home.    Parents were encouraged to call for further questions that arise prior to discharge.

## 2024-07-04 NOTE — PROGRESS NOTES
Maternal discharge education reviewed with pt and partner. Educational flyers including [employer] magnet provided and discussed. Questions encouraged and answered, questions encouraged throughout remainder of stay. iPad educational videos complete, PPD screen low risk.

## 2024-07-04 NOTE — ASSESSMENT & PLAN NOTE
- Plt 114 on PPD#1, down from 161 on admission. Likely gestational thrombocytopenia. Patient clinically stable. No need for repeat unless concern for bleeding.

## 2024-07-04 NOTE — DISCHARGE SUMMARY
Discharge Summary - Contreras Rosenthal 39 y.o. female MRN: [id number]    Unit/Bed#: -01 Encounter: [id number]    ADMISSION  Admission Date: 2024   Admitting Attending: Dr. Johann Chauhan MD  Admitting Diagnoses:   Patient Active Problem List   Diagnosis    Multigravida of advanced maternal age in third trimester    Anxiety during pregnancy in third trimester, antepartum    History of loop electrosurgical excision procedure (LEEP)    Family history of breast cancer in first degree relative    Ulcerative colitis (HCC)    Anxiety    Pelvic pain in pregnancy    37 weeks gestation of pregnancy    Vanishing twin syndrome    Nausea and vomiting in pregnancy    Full-term premature rupture of membranes with onset of labor within 24 hours of rupture    Spontaneous vaginal delivery    Thrombocytopenia affecting pregnancy (HCC)       DELIVERY  Delivery Method: Vaginal, Spontaneous   Delivery Date and Time: 7/3/2024 2:24 AM  Delivery Attending: Husam Mathews    DISCHARGE  Discharge Date: 2024  Discharge Attending: Dr. Johann Chauhan MD  Discharge Diagnosis:   Same, Delivered    Clinical course: Admission to Delivery  Contreras Rosenthal is a 39 y.o.  who was admitted at 37w5d for premature rupture of membranes at term.    Reason for induction: PROM    Induction method: Pitocin    Delivery  Route of Delivery: Vaginal, Spontaneous    Anesthesia: None,   QBL: Non-Surgical QBL (mL): 100        Delivery: Vaginal, Spontaneous at 7/3/2024 2:24 AM  Laceration: Perineal: None Repaired?  N/A    Baby's Weight: 2920 g (6 lb 7 oz); 103    Apgar scores: 9  and 9  at 1 and 5 minutes, respectively      Clinical Course: Post-Delivery:  The post delivery course was unremarkable    On the day of discharge, the patient was ambulating, voiding spontaneously, tolerating oral intake, and hemodynamically stable. She was able to reasonably perform all ADLs. She had appropriate bowel function. Pain was well-controlled. She was  discharged home on postpartum/postop day #1 without complications. Patient was instructed to follow up with her OB as an outpatient and was given appropriate warnings to call her provider with problems or concerns.    Pertinent lab findings included:   Blood type A positive.     Last three Hgb values:  Lab Results   Component Value Date    HGB 12.6 2024    HGB 12.2 2024    HGB 11.4 2024        Problem-specific follow-up plans included the following:  Problem List              Multigravida of advanced maternal age in third trimester    Anxiety during pregnancy in third trimester, antepartum    Overview     On Zoloft for depression and trazodone for sleep.          37 weeks gestation of pregnancy    Vanishing twin syndrome    Overview     Di-di twins identified at initial prenatal visit at 8 weeks. Demise of one twin noted at routine prenatal visit at 14 weeks. Fetal pole small, likely occurred shortly after 8 week visit.          Nausea and vomiting in pregnancy    Overview     Hx hyperemesis gravidarum with 3rd pregnancy. Required oral steroid taper.   Zofran works best for her. Reglan and phenergan did not work in prior pregnancy.   Recommend starting pyridoxine/doxylamine.          * (Principal) Spontaneous vaginal delivery    Thrombocytopenia affecting pregnancy (HCC)    Overview     Plt 114 on PPD#1, down from 161 on admission. Likely gTCP.          Current Assessment & Plan     - Plt 114 on PPD#1, down from 161 on admission. Likely gestational thrombocytopenia. Patient clinically stable. No need for repeat unless concern for bleeding.             Other    History of loop electrosurgical excision procedure (LEEP)    Overview     Full-term  x 2 since LEEP         Family history of breast cancer in first degree relative    Overview     Patient's sister recently diagnosed with breast cancer at age 42. Unsure if she was tested for hereditary cancer syndromes.     Recommend initiation of  screening mammography now. Patient currently breastfeeding. As such, will obtain baseline exam 6 months after cessation of breastfeeding. Order provided today.     Recommend consultation with [facility].          Ulcerative colitis (HCC)    Overview     Dr. Chowdary   - on sulfasalazine for UC - continue during pregnancy         Anxiety    Overview     managed by PCP         Pelvic pain in pregnancy    Full-term premature rupture of membranes with onset of labor within 24 hours of rupture        Discharge med list:     Medication List      START taking these medications     acetaminophen 325 mg tablet; Commonly known as: TYLENOL; Take 2 tablets   (650 mg total) by mouth every 4 (four) hours as needed for mild pain   docusate sodium 100 mg capsule; Commonly known as: COLACE; Take 1   capsule (100 mg total) by mouth 2 (two) times a day as needed for   constipation   ibuprofen 200 mg tablet; Commonly known as: MOTRIN; Take 3 tablets (600   mg total) by mouth every 6 (six) hours   witch hazel-glycerin topical pad; Commonly known as: TUCKS; Apply 1 Pad   topically every 4 (four) hours as needed for irritation     CONTINUE taking these medications     PRENATAL VITAMIN PO   sertraline 100 mg tablet; Commonly known as: ZOLOFT   sulfaSALAzine 500 mg EC tablet; Commonly known as: AZULFIDINE-EN; Take 1   tablet (500 mg total) by mouth 4 (four) times a day   traZODone 100 mg tablet; Commonly known as: DESYREL       Condition at discharge:   good     Disposition:   Home    Planned Readmission:   No    Discharge Statement   I spent 30 minutes or less discharging the patient. This time was spent on the day of discharge. I had direct contact with the patient on the day of discharge. Greater than 50% of the total time was spent examining patient, answering all patient questions, arranging and discussing plan of care with patient as well as directly providing post-discharge instructions. Additional time then spent on  discharge activities.

## 2024-07-04 NOTE — PLAN OF CARE
Problem: POSTPARTUM  Goal: Experiences normal postpartum course  Description: INTERVENTIONS:  - Monitor maternal vital signs  - Assess uterine involution and lochia  2024 by Sol Penn RN  Outcome: Completed  2024 by Sol Penn RN  Outcome: Progressing  Goal: Appropriate maternal -  bonding  Description: INTERVENTIONS:  - Identify family support  - Assess for appropriate maternal/infant bonding   -Encourage maternal/infant bonding opportunities  - Referral to  or  as needed  2024 by Sol Penn RN  Outcome: Completed  2024 by Sol Penn RN  Outcome: Progressing  Goal: Establishment of infant feeding pattern  Description: INTERVENTIONS:  - Assess breast/bottle feeding  - Refer to lactation as needed  2024 by Sol Penn RN  Outcome: Completed  2024 by Sol Penn RN  Outcome: Progressing     Problem: PAIN -   Goal: Displays adequate comfort level or baseline comfort level  Description: INTERVENTIONS:  - Perform pain scoring using age-appropriate tool with hands-on care as needed.  Notify physician/AP of high pain scores not responsive to comfort measures  - Administer analgesics based on type and severity of pain and evaluate response  - Sucrose analgesia per protocol for brief minor painful procedures  - Teach parents interventions for comforting infant  2024 by Sol Penn RN  Outcome: Completed  2024 by Sol Penn RN  Outcome: Progressing     Problem: Knowledge Deficit  Goal: Patient/family/caregiver demonstrates understanding of disease process, treatment plan, medications, and discharge instructions  Description: Complete learning assessment and assess knowledge base.  Interventions:  - Provide teaching at level of understanding  - Provide teaching via preferred learning methods  2024 by Sol Penn RN  Outcome: Completed  2024 by Sol  Isamar, RN  Outcome: Progressing     Problem: DISCHARGE PLANNING  Goal: Discharge to home or other facility with appropriate resources  Description: INTERVENTIONS:  - Identify barriers to discharge w/patient and caregiver  - Arrange for needed discharge resources and transportation as appropriate  - Identify discharge learning needs (meds, wound care, etc.)  - Arrange for interpretive services to assist at discharge as needed  - Refer to Case Management Department for coordinating discharge planning if the patient needs post-hospital services based on physician/advanced practitioner order or complex needs related to functional status, cognitive ability, or social support system  7/4/2024 0837 by Sol Penn, RN  Outcome: Completed  7/4/2024 0837 by Sol Penn, RN  Outcome: Progressing

## 2024-07-04 NOTE — PLAN OF CARE
Problem: POSTPARTUM  Goal: Experiences normal postpartum course  Description: INTERVENTIONS:  - Monitor maternal vital signs  - Assess uterine involution and lochia  Outcome: Progressing  Goal: Appropriate maternal -  bonding  Description: INTERVENTIONS:  - Identify family support  - Assess for appropriate maternal/infant bonding   -Encourage maternal/infant bonding opportunities  - Referral to  or  as needed  Outcome: Progressing  Goal: Establishment of infant feeding pattern  Description: INTERVENTIONS:  - Assess breast/bottle feeding  - Refer to lactation as needed  Outcome: Progressing  Goal: Incision(s), wounds(s) or drain site(s) healing without S/S of infection  Description: INTERVENTIONS  - Assess and document dressing, incision, wound bed, drain sites and surrounding tissue  - Provide patient and family education    Outcome: Completed     Problem: THERMOREGULATION - PEDIATRICS  Goal: Maintains normal body temperature  Description: Interventions:  - Monitor temperature (axillary for Newborns) as ordered  - Monitor for signs of hypothermia or hyperthermia  - Provide thermal support measures  - Wean to open crib when appropriate  Outcome: Completed     Problem: INFECTION -   Goal: No evidence of infection  Description: INTERVENTIONS:  - Instruct family/visitors to use good hand hygiene technique  - Identify and instruct in appropriate isolation precautions for identified infection/condition  - Change incubator every 2 weeks or as needed.  - Monitor for symptoms of infection  - Monitor surgical sites and insertion sites for all indwelling lines, tubes, and drains for drainage, redness, or edema.  - Monitor endotracheal and nasal secretions for changes in amount and color  - Monitor culture and CBC results  - Administer antibiotics as ordered.  Monitor drug levels  Outcome: Completed     Problem: RISK FOR INFECTION (RISK FACTORS FOR MATERNAL CHORIOAMNIOITIS -  )  Goal: No evidence of infection  Description: INTERVENTIONS:  - Instruct family/visitors to use good hand hygiene technique  - Monitor for symptoms of infection  - Monitor culture and CBC results  - Administer antibiotics as ordered.  Monitor drug levels  Outcome: Completed     Problem: SAFETY -   Goal: Patient will remain free from falls  Description: INTERVENTIONS:  - Instruct family/caregiver on patient safety  - Keep incubator doors and portholes closed when unattended  - Keep radiant warmer side rails and crib rails up when unattended  - Based on caregiver fall risk screen, instruct family/caregiver to ask for assistance with transferring infant if caregiver noted to have fall risk factors  Outcome: Completed     Problem: Knowledge Deficit  Goal: Patient/family/caregiver demonstrates understanding of disease process, treatment plan, medications, and discharge instructions  Description: Complete learning assessment and assess knowledge base.  Interventions:  - Provide teaching at level of understanding  - Provide teaching via preferred learning methods  Outcome: Progressing  Goal: Infant caregiver verbalizes understanding of benefits of skin-to-skin with healthy   Description: Prior to delivery, educate patient regarding skin-to-skin practice and its benefits  Initiate immediate and uninterrupted skin-to-skin contact after birth until breastfeeding is initiated or a minimum of one hour  Encourage continued skin-to-skin contact throughout the post partum stay    Outcome: Completed  Goal: Infant caregiver verbalizes understanding of benefits and management of breastfeeding their healthy   Description: Help initiate breastfeeding within one hour of birth  Educate/assist with breastfeeding positioning and latch  Educate on safe positioning and to monitor their  for safety  Educate on how to maintain lactation even if they are  from their   Educate/initiate pumping  for a mom with a baby in the NICU within 6 hours after birth  Give infants no food or drink other than breast milk unless medically indicated  Educate on feeding cues and encourage breastfeeding on demand    Outcome: Completed  Goal: Infant caregiver verbalizes understanding of benefits to rooming-in with their healthy   Description: Promote rooming in 23 out of 24 hours per day  Educate on benefits to rooming-in  Provide  care in room with parents as long as infant and mother condition allow    Outcome: Completed  Goal: Provide formula feeding instructions and preparation information to caregivers who do not wish to breastfeed their   Description: Provide one on one information on frequency, amount, and burping for formula feeding caregivers throughout their stay and at discharge.  Provide written information/video on formula preparation.    Outcome: Completed  Goal: Infant caregiver verbalizes understanding of support and resources for follow up after discharge  Description: Provide individual discharge education on when to call the doctor.  Provide resources and contact information for post-discharge support.    Outcome: Completed     Problem: DISCHARGE PLANNING  Goal: Discharge to home or other facility with appropriate resources  Description: INTERVENTIONS:  - Identify barriers to discharge w/patient and caregiver  - Arrange for needed discharge resources and transportation as appropriate  - Identify discharge learning needs (meds, wound care, etc.)  - Arrange for interpretive services to assist at discharge as needed  - Refer to Case Management Department for coordinating discharge planning if the patient needs post-hospital services based on physician/advanced practitioner order or complex needs related to functional status, cognitive ability, or social support system  Outcome: Progressing

## 2024-07-04 NOTE — PLAN OF CARE
Problem: POSTPARTUM  Goal: Experiences normal postpartum course  Description: INTERVENTIONS:  - Monitor maternal vital signs  - Assess uterine involution and lochia  Outcome: Progressing  Goal: Appropriate maternal -  bonding  Description: INTERVENTIONS:  - Identify family support  - Assess for appropriate maternal/infant bonding   -Encourage maternal/infant bonding opportunities  - Referral to  or  as needed  Outcome: Progressing  Goal: Establishment of infant feeding pattern  Description: INTERVENTIONS:  - Assess breast/bottle feeding  - Refer to lactation as needed  Outcome: Progressing     Problem: PAIN -   Goal: Displays adequate comfort level or baseline comfort level  Description: INTERVENTIONS:  - Perform pain scoring using age-appropriate tool with hands-on care as needed.  Notify physician/AP of high pain scores not responsive to comfort measures  - Administer analgesics based on type and severity of pain and evaluate response  - Sucrose analgesia per protocol for brief minor painful procedures  - Teach parents interventions for comforting infant  Outcome: Progressing     Problem: Knowledge Deficit  Goal: Patient/family/caregiver demonstrates understanding of disease process, treatment plan, medications, and discharge instructions  Description: Complete learning assessment and assess knowledge base.  Interventions:  - Provide teaching at level of understanding  - Provide teaching via preferred learning methods  Outcome: Progressing     Problem: DISCHARGE PLANNING  Goal: Discharge to home or other facility with appropriate resources  Description: INTERVENTIONS:  - Identify barriers to discharge w/patient and caregiver  - Arrange for needed discharge resources and transportation as appropriate  - Identify discharge learning needs (meds, wound care, etc.)  - Arrange for interpretive services to assist at discharge as needed  - Refer to Case Management Department for  coordinating discharge planning if the patient needs post-hospital services based on physician/advanced practitioner order or complex needs related to functional status, cognitive ability, or social support system  Outcome: Progressing

## 2024-07-04 NOTE — PROGRESS NOTES
Progress Note - OB/GYN  Post-Partum Physician Note   Contreras Rosenthal 39 y.o. female MRN: [id number]  Unit/Bed#:  204-01 Encounter: [id number]    Assessment:  39 y.o. year-old , postpartum day #1 s/p     Plan:   Continue routine postpartum care  Encourage ambulation  Clinically stable for discharge to home today.    Thrombocytopenia affecting pregnancy (HCC)  - Plt 114 on PPD#1, down from 161 on admission. Likely gestational thrombocytopenia. Patient clinically stable. No need for repeat unless concern for bleeding.     _________________________________    Subjective:   Pain: Well controlled  Tolerating Oral Intake: Yes  Voiding: Yes  Ambulating: Yes  Breastfeeding: Yes  Chest Pain: No  Shortness of Breath: No  Leg Pain/Discomfort: No  Lochia: Normal    Objective:   Vitals:    24 1512 24 1930 24 0311 24 0745   BP: 105/59 91/55 99/58 96/68   BP Location: Left arm Left arm Left arm Left arm   Pulse: 60 65 55 60   Resp: 18 18 16 16   Temp: 98.1 °F (36.7 °C) 98.2 °F (36.8 °C) 97.9 °F (36.6 °C) 97.7 °F (36.5 °C)   TempSrc: Oral Oral Tympanic Oral   SpO2: 97% 100% 97% 100%   Weight:       Height:         No intake or output data in the 24 hours ending 24 0800    Physical Exam:  General: in no apparent distress, alert, oriented times 3, afebrile, and normal vitals  Abdomen: abdomen is soft without significant tenderness, masses, organomegaly or guarding  Fundus: Firm and non-tender, 1 below the umbilicus  Lower extremeties: non-tender    Labs/Tests:   Lab Results   Component Value Date/Time    HGB 12.6 2024 07:41 AM    HGB 12.2 2024 07:32 PM     (L) 2024 07:41 AM     2024 07:32 PM    WBC 6.72 2024 07:41 AM    WBC 7.39 2024 07:32 PM        Brief OB Lab review:  ABO Grouping   Date Value Ref Range Status   2024 A  Final      Rh Factor   Date Value Ref Range Status   2024 Positive  Final     Rh Type   Date Value Ref Range  "Status   12/27/2023 Positive  Final     Comment:     Please note: Prior records for this patient's ABO / Rh type are not  available for additional verification.      No results found for: \"ANTIBODYSCR\"  No results found for: \"RUBM\"    MEDS:   Current Facility-Administered Medications   Medication Dose Route Frequency    acetaminophen (TYLENOL) tablet 650 mg  650 mg Oral Q4H PRN    benzocaine-menthol-lanolin-aloe (DERMOPLAST) 20-0.5 % topical spray 1 Application  1 Application Topical Q6H PRN    calcium carbonate (TUMS) chewable tablet 1,000 mg  1,000 mg Oral Daily PRN    diphenhydrAMINE (BENADRYL) tablet 25 mg  25 mg Oral Q6H PRN    docusate sodium (COLACE) capsule 100 mg  100 mg Oral BID PRN    hydrocortisone 1 % cream 1 Application  1 Application Topical Daily PRN    ibuprofen (MOTRIN) tablet 600 mg  600 mg Oral Q6H    ondansetron (ZOFRAN) injection 4 mg  4 mg Intravenous Q8H PRN    oxyCODONE (ROXICODONE) IR tablet 5 mg  5 mg Oral Q3H PRN    oxytocin (PITOCIN) 30 Units in lactated ringers 500 mL infusion  250 rishabh-units/min Intravenous Once    sertraline (ZOLOFT) tablet 50 mg  50 mg Oral Daily    sulfaSALAzine (AZULFIDINE-EN) EC tablet 500 mg  500 mg Oral 4x Daily    traZODone (DESYREL) tablet 100 mg  100 mg Oral HS PRN    witch hazel-glycerin (TUCKS) topical pad 1 Pad  1 Pad Topical Q4H PRN     Invasive Devices       None                     Johann Chauhan MD  7/4/2024 8:00 AM             "

## 2024-07-08 PROCEDURE — 88307 TISSUE EXAM BY PATHOLOGIST: CPT | Performed by: PATHOLOGY

## 2024-07-24 ENCOUNTER — POSTPARTUM VISIT (OUTPATIENT)
Dept: OBGYN CLINIC | Facility: CLINIC | Age: 39
End: 2024-07-24

## 2024-07-24 VITALS
DIASTOLIC BLOOD PRESSURE: 48 MMHG | HEIGHT: 64 IN | BODY MASS INDEX: 18.54 KG/M2 | WEIGHT: 108.6 LBS | SYSTOLIC BLOOD PRESSURE: 88 MMHG

## 2024-07-24 PROCEDURE — 99024 POSTOP FOLLOW-UP VISIT: CPT | Performed by: STUDENT IN AN ORGANIZED HEALTH CARE EDUCATION/TRAINING PROGRAM

## 2024-07-24 NOTE — PROGRESS NOTES
"[facility] [address], [address], [place] [address]    Assessment/Plan:  Contreras is a 39 y.o. year old  who presents for postpartum visit.    Routine Postpartum Care  Normal postpartum exam  Contraception:  Condoms as bridge to vasectomy  Depression Screen: Low risk  Feeding: Breast  Cervical cancer screening Up to Date  Follow up in: 3 months for annual exam or as needed.    Additional Problems:  1. Postpartum care following vaginal delivery      Subjective:     CC: Postpartum visit    Contreras Rosenthal is a 39 y.o. y.o. female  who presents for a postpartum visit.     She is 3 weeks postpartum following a vaginal delivery on 7/3/2024 at 37 weeks. Postpartum course has been uncomplicated.     Bleeding staining only. Bowel function is normal. Bladder function is normal. Patient is not sexually active.     Postpartum Depression: Low Risk  (2024)    [place]  Depression Scale     Last EPDS Total Score: 0     Last EPDS Self Harm Result: Never       The following portions of the patient's history were reviewed and updated as appropriate: allergies, current medications, past family history, past medical history, obstetric history, gynecologic history, past social history, past surgical history and problem list.    Objective:  BP (!) 88/48 (BP Location: Left arm, Patient Position: Sitting, Cuff Size: Standard)   Ht 5' 3.75\" (1.619 m)   Wt 49.3 kg (108 lb 9.6 oz)   LMP 10/13/2023 (Exact Date)   Breastfeeding Yes   BMI 18.79 kg/m²   Pregravid Weight/BMI: 46.3 kg (102 lb) (BMI 17.24)  Current Weight: 49.3 kg (108 lb 9.6 oz)   Total Weight Gain: 9.072 kg (20 lb)   Pre-Renny Vitals      Flowsheet Row Most Recent Value   Prenatal Assessment    Prenatal Vitals    Blood Pressure 88/48   Weight - Scale 49.3 kg (108 lb 9.6 oz)   Urine Albumin/Glucose    Dilation/Effacement/Station    Vaginal Drainage    Edema              Chaperone present? Yes: Danya Rand MA.    General " Appearance: alert and oriented, in no acute distress.   Abdomen: Soft, non-tender, non-distended, no masses, no rebound or guarding.  Pelvic:       External genitalia: Normal appearance, no abnormal pigmentation, no lesions or masses. Normal Bartholin's and [place]'s. Obstetric laceration well-healed.       Urinary system: Urethral meatus normal, bladder non-tender.      Vaginal: normal mucosa without prolapse or lesions. Normal-appearing physiologic discharge.      Cervix: Normal-appearing, well-epithelialized, no gross lesions or masses. No cervical motion tenderness.      Adnexa: No adnexal masses or tenderness noted.      Uterus: Normal-sized, regular contour, midline, mobile, no uterine tenderness.   Extremities: Normal range of motion.   Skin: normal, no rash or abnormalities  Neurologic: alert, oriented x3  Psychiatric: Appropriate affect, mood stable, cooperative with exam.        Johann Chauhan MD  7/24/2024 12:07 PM

## 2024-08-07 ENCOUNTER — OFFICE VISIT (OUTPATIENT)
Dept: GASTROENTEROLOGY | Facility: CLINIC | Age: 39
End: 2024-08-07
Payer: COMMERCIAL

## 2024-08-07 VITALS
BODY MASS INDEX: 18.13 KG/M2 | HEIGHT: 65 IN | WEIGHT: 108.8 LBS | SYSTOLIC BLOOD PRESSURE: 102 MMHG | DIASTOLIC BLOOD PRESSURE: 62 MMHG

## 2024-08-07 DIAGNOSIS — K51.00 ULCERATIVE PANCOLITIS WITHOUT COMPLICATION (HCC): Primary | ICD-10-CM

## 2024-08-07 DIAGNOSIS — K21.9 GASTROESOPHAGEAL REFLUX DISEASE WITHOUT ESOPHAGITIS: ICD-10-CM

## 2024-08-07 PROCEDURE — 99214 OFFICE O/P EST MOD 30 MIN: CPT | Performed by: INTERNAL MEDICINE

## 2024-08-07 NOTE — PROGRESS NOTES
[facility] - Outpatient Follow-up Note  Contreras Rosenthal 39 y.o. female MRN: [id number]  Encounter: [id number]    ASSESSMENT AND PLAN:      1. Ulcerative pancolitis without complication (HCC)  Diagnosed 2013 and treated with mesalamine.  Also had a flare-up in 2014 associated with C diff. off medications for years before a flare-up in July/August 2022.  Treated with prednisone, colonoscopy September 2022 showed mild pancolitis, ileal biopsy showed active inflammation but no signs of chronicity.  Small bowel capsule negative for small bowel inflammation     Was in clinical remission on mesalamine 4.8g daily prior to pregnancy, required transition to sulfasalazine due to insurance currently taking 500 mg 4 times daily with mild recurrence of symptoms after delivery 7/3.  She will increase to 2 pills 4 times daily.  She will check her interference formulary for a once daily option and contact me via the portal    Due for surveillance colonoscopy, but will defer for now due to upcoming medication adjustment.     2. Gastroesophageal reflux disease without esophagitis  Had a flare of symptoms during pregnancy requiring Prilosec, now asymptomatic off meds.      Followup Appointment: 6 months  ______________________________________________________________________    Chief Complaint   Patient presents with    Follow-up     Pregnancy put her into remission, starting to have some symptoms, but not terrible     HPI: Patient presents for follow-up on ulcerative colitis.  She had a vaginal delivery July 3.  Prior to pregnancy she was on mesalamine 4.8 g once daily.  During the pregnancy we transitioned to sulfasalazine due to insurance issues, it was prescribed 4 times daily but she was only taking it daily.  As symptoms returned after pregnancy she has increased it to 4 times daily.  She is 3 soft, urgent stools daily mostly clustered in the morning.  There is no blood or mucus.  She denies  any symptoms later in the day.  She denies any abdominal pain.  Hyperemesis in the first trimester that resolved.  She transiently required Prilosec for reflux but is now asymptomatic off meds.    Historical Information   Past Medical History:   Diagnosis Date    Anemia     Anxiety     managed by PCP    C. difficile colitis 2016    during pregnancy    Irritable bowel syndrome     LGSIL on Pap smear of cervix     had leep 2016    Ulcerative colitis (HCC)     Dr. Chowdary     Past Surgical History:   Procedure Laterality Date    CERVICAL BIOPSY  W/ LOOP ELECTRODE EXCISION  2016    COLONOSCOPY      COLONOSCOPY  09/06/2022    mild pancolitis    WISDOM TOOTH EXTRACTION  2000     Social History     Substance and Sexual Activity   Alcohol Use Never     Social History     Substance and Sexual Activity   Drug Use Never    Comment: No use     Social History     Tobacco Use   Smoking Status Never    Passive exposure: Never   Smokeless Tobacco Never     Family History   Problem Relation Age of Onset    Heart disease Father     Hypertension Father     Thyroid disease Father     Anemia Sister     Depression Sister     Inflammatory bowel disease Sister     Breast cancer Sister     Irritable bowel syndrome Sister     Ulcerative colitis Sister     No Known Problems Sister     Breast cancer Sister 42    No Known Problems Sister     Colon polyps Brother     Depression Brother     No Known Problems Brother     No Known Problems Daughter     No Known Problems Son     Colon cancer Neg Hx     Ovarian cancer Neg Hx     Uterine cancer Neg Hx     Prostate cancer Neg Hx     Pancreatic cancer Neg Hx          Current Outpatient Medications:     Prenatal Vit-Fe Fumarate-FA (PRENATAL VITAMIN PO)    sertraline (ZOLOFT) 100 mg tablet    sulfaSALAzine (AZULFIDINE-EN) 500 mg EC tablet  Allergies   Allergen Reactions    Amoxicillin Other (See Comments), Diarrhea, GI Bleeding and GI Intolerance     C-diff, per pt  C-diff, per pt     Reviewed  "medications and allergies and updated as indicated    PHYSICAL EXAM:    Blood pressure 102/62, height 5' 4.5\" (1.638 m), weight 49.4 kg (108 lb 12.8 oz), last menstrual period 10/13/2023, currently breastfeeding. Body mass index is 18.39 kg/m².  General Appearance: NAD, cooperative, alert  Eyes: Anicteric, conjunctiva pink  ENT:  Normocephalic, atraumatic, normal mucosa.    Neck:  Supple, symmetrical, trachea midline  Resp:  Clear to auscultation bilaterally; no rales, rhonchi or wheezing; respirations unlabored   CV:  S1 S2, Regular rate and rhythm; no murmur, rub, or gallop.  GI:  Soft, non-tender, non-distended; normal bowel sounds; no masses, no organomegaly   Rectal: Deferred  Musculoskeletal: No cyanosis, clubbing or edema. Normal ROM.  Skin:  No jaundice, rashes, or lesions   Heme/Lymph: No palpable cervical lymphadenopathy  Psych: Normal affect, good eye contact  Neuro: No gross deficits, AAOx3    Lab Results:   Lab Results   Component Value Date    WBC 6.72 07/04/2024    HGB 12.6 07/04/2024    HCT 39.6 07/04/2024    MCV 95 07/04/2024     (L) 07/04/2024     Lab Results   Component Value Date    K 4.3 06/06/2023     06/06/2023    CO2 23 06/06/2023    BUN 11 06/06/2023    CREATININE 0.87 06/06/2023    AST 26 06/06/2023    ALT 21 06/06/2023    EGFR 88 06/06/2023       Radiology Results:   No results found.  Answers submitted by the patient for this visit:  Inflammatory Bowel Disease (Submitted on 8/1/2024)  When you are not experiencing symptoms of your inflammatory bowel disease, how many bowel movements do you typically have each day?: 3  What is the average (typical) number of bowel movements that you had in a single day during the last week?: 3  Over the last 3 days, have you had any bowel movements where you passed blood without stool?: No  Since your last visit, have you received any vaccinations?: Yes  Since the last visit, have you had an infection?: No  Is there any possibility that you " may be pregnant?: No  In the past three months, have you used tobacco in any form?: No  During the last year, how many days have you missed work or school because of your inflammatory bowel disease?: 0  During the last year, how many days have you been hospitalized because of your inflammatory bowel disease?: 0  During the last year, how many days have you visited a hospital emergency department because of your inflammatory bowel disease?: 0  During the last month, have you taken narcotic pain medications (such as Percocet, oxycodone, Oxycontin, morphine, Vicodin, Dilaudid, MS Contin) for your inflammatory bowel disease?: No  Have you awoken at night because you needed to move your bowels during the last month? : No  Have you had leakage of stool while sleeping during the last month?: No  Have you had leakage of stool while you were awake during the last month?: No  In the last 6 months, have you unintentionally lost weight?: No  Fever: No  Eye irritation: No  Mouth sores: No  Sore throat: No  Chest pain: No  Shortness of breath: No  Numbness or tingling in your hands or feet: No  Skin rash: No  Pain or swelling in your joints: No  Bruising or bleeding: No  Felt depressed or blue: No

## 2024-08-07 NOTE — PATIENT INSTRUCTIONS
I recommend increasing the sulfasalazine to 2 pills 4 times daily if you have enough on hand    Please check your prescription formulary on the insurance website, ideally we will find a once daily medication that provides good relief.  Some of the options are mesalamine 1.2 g (AKA Lialda), apriso, Asa call or Delzicol.  Please send me a screenshot of that GI medication page via the portal and we can make decisions based on that.    Is contact me at any point if the upper GI symptoms flare.

## 2025-05-08 ENCOUNTER — NURSE TRIAGE (OUTPATIENT)
Age: 40
End: 2025-05-08

## 2025-05-08 ENCOUNTER — PATIENT MESSAGE (OUTPATIENT)
Dept: OBGYN CLINIC | Facility: CLINIC | Age: 40
End: 2025-05-08

## 2025-05-08 DIAGNOSIS — O91.23 MASTITIS ASSOCIATED WITH LACTATION: ICD-10-CM

## 2025-05-08 DIAGNOSIS — O91.23 MASTITIS ASSOCIATED WITH LACTATION: Primary | ICD-10-CM

## 2025-05-08 RX ORDER — DICLOXACILLIN SODIUM 500 MG/1
500 CAPSULE ORAL 4 TIMES DAILY
Qty: 56 CAPSULE | Refills: 0 | Status: SHIPPED | OUTPATIENT
Start: 2025-05-08 | End: 2025-05-22

## 2025-05-08 RX ORDER — ERYTHROMYCIN 500 MG/1
500 TABLET, DELAYED RELEASE ORAL EVERY 6 HOURS
Qty: 40 TABLET | Refills: 0 | Status: SHIPPED | OUTPATIENT
Start: 2025-05-08 | End: 2025-05-08

## 2025-05-08 RX ORDER — DICLOXACILLIN SODIUM 500 MG/1
500 CAPSULE ORAL 4 TIMES DAILY
Qty: 56 CAPSULE | Refills: 0 | Status: SHIPPED | OUTPATIENT
Start: 2025-05-08 | End: 2025-05-08 | Stop reason: SDUPTHER

## 2025-05-08 NOTE — TELEPHONE ENCOUNTER
"FOLLOW UP: Erythromycin was sent to the pharmacy for you.  Call back if symptoms do not begin to improve or you have worsening of symptoms.    REASON FOR CONVERSATION: MASTITIS    SYMPTOMS: Left breast pain, redness, warm to touch, chills and nausea.  Generally not feeling well.     OTHER: Pt is breastfeeding, and skipped a session.  Pain started last night.  Pt advised she is not able to take ibuprofen so she has been taking tylenol.  Denies fevers at this time.  She advised breastfeeding is painful. Baby is acting normal.  Yearly appt made also.  Allergies/pharmacy reviewed.    DISPOSITION: Send Prescription Now and Provide Home Care Advice (overriding  Now)      Does the patient have an allergy to Penicillin? Yes    If YES, prescribe: Erythromycin 500mg Q6H x 10 days with no refills.     If NO, prescribe: Dicloxacillin 500mg QID x 10 days with no refills.      Patient instructions:  Call and schedule an appoinment if symptoms are not better after 24-48h of abx use.    Please offer Baby & Me services to patient - if patient agrees, please place AMB REFERRAL TO [facility] for lactation services     Reason for Disposition   Breast pain or lump and mother has chills or feeling overall ill    Answer Assessment - Initial Assessment Questions  2. PAIN: \"Do you have breast pain?\" If yes: \"How bad is the pain?\" (Scale 1-10; or mild, moderate, severe)      moderate  3. ONSET: \"When did the breast pain start?\" (e.g., hours, days)      Last night  4. SKIN: \"Is the skin red?\"      yes  5. LOCATION: \"Which breast?\" (e.g., left, right, both)      left  6. BREASTFEEDING: \"Are you still breastfeeding?\" OR \"Are you pumping (expressing milk) and giving it in a bottle?\" Reason: Mothers who are pumping excessively over 8x in 24 hours or at high pressure settings can cause trauma that can lead to pain or infection.      yes  7. FEVER: \"Do you have a fever?\" If so, ask: \"What is it, how was it measured, and when " "did it start?\"      denies  8. OTHER SYMPTOMS: \"Do you have any other symptoms?\" (e.g., weakness, chills, nausea)      Chills and nausea  9.  BABY: \"How is your baby feeding?\" \"How is your baby acting?\"      normal    Protocols used: Breastfeeding - Mother's Breast Symptoms or Illness-Pediatric-[place]    "

## 2025-05-08 NOTE — TELEPHONE ENCOUNTER
Regarding: L breast pain with chills no fever  ----- Message from Vicki MCGEE sent at 5/8/2025  8:46 AM EDT -----  Patient is having L breast pain with chills no fever she is a [facility] pt. She also states it warm to the touch.

## 2025-05-08 NOTE — TELEPHONE ENCOUNTER
Patient called in, checked with multiple [facility] and [facility] pharmacies. Erythromycin is out of stock. Would like to know if there is an alternative that can be ordered. Reviewed allergies, patient states amoxicillin was listed as allergy to be cautious as she developed CDiff after use related to her ulcerative colitis so she was told it is not a true allergy. Advised will review with provider.

## 2025-05-08 NOTE — TELEPHONE ENCOUNTER
Recommend treatment with dicloxacillin. Reassure patient that I don't believe she has a true penicillin allergy and I have removed this from her allergy list. C diff is an uncommon complication of antibiotics. Benefits of dicloxacillin in this clinical scenario outweigh risks. Rx sent. Recommend that she take an OTC probiotic to ensure continued healthy GI ishmael and reduce risk of C diff.     Johann Chauhan MD  5/8/2025 10:59 AM

## 2025-05-08 NOTE — TELEPHONE ENCOUNTER
"Pt calling in saying that her medication wasn't received from the pharmacy, pt was notified that it is a processing error, and it will be fixed and sent in again to pharmacy, pharmacy on file confirmed.       Medication was sent in to confirmed pharmacy.     Reason for Disposition   Prescription prescribed recently is not at pharmacy and triager has access to patient's EMR and prescription is recorded in the EMR    Answer Assessment - Initial Assessment Questions  1. NAME of MEDICINE: \"What medicine(s) are you calling about?\"      dicloxacillin (DYNAPEN) 500 MG capsule  2. QUESTION: \"What is your question?\" (e.g., double dose of medicine, side effect)      Was not received from pharmacy- sent as phone in   3. PRESCRIBER: \"Who prescribed the medicine?\" Reason: if prescribed by specialist, call should be referred to that group.      Johann Chauhan MD    Protocols used: Medication Question Call-Adult-[place]    "

## 2025-05-08 NOTE — TELEPHONE ENCOUNTER
Pt calling in saying she had medication sent over for mastitis, and the medication isnt at the pharmacy. Pt was notified it will be resent to pharmacy, pt verbalized understanding.

## 2025-05-08 NOTE — TELEPHONE ENCOUNTER
Return call to patient, reviewed provider recommendation.  Pt verbalized understanding, no further questions or concerns at this time.

## 2025-05-12 ENCOUNTER — TELEPHONE (OUTPATIENT)
Dept: GASTROENTEROLOGY | Facility: CLINIC | Age: 40
End: 2025-05-12

## 2025-06-21 DIAGNOSIS — K51.30 ULCERATIVE RECTOSIGMOIDITIS WITHOUT COMPLICATION (HCC): ICD-10-CM

## 2025-06-23 RX ORDER — SULFASALAZINE 500 MG/1
500 TABLET, DELAYED RELEASE ORAL 4 TIMES DAILY
Qty: 120 TABLET | Refills: 5 | Status: SHIPPED | OUTPATIENT
Start: 2025-06-23

## 2025-07-01 ENCOUNTER — PATIENT MESSAGE (OUTPATIENT)
Dept: GASTROENTEROLOGY | Facility: CLINIC | Age: 40
End: 2025-07-01

## 2025-07-01 DIAGNOSIS — K51.30 ULCERATIVE RECTOSIGMOIDITIS WITHOUT COMPLICATION (HCC): Primary | ICD-10-CM

## 2025-07-01 RX ORDER — BUDESONIDE 3 MG/1
9 CAPSULE, COATED PELLETS ORAL DAILY
Qty: 90 CAPSULE | Refills: 2 | Status: SHIPPED | OUTPATIENT
Start: 2025-07-01 | End: 2025-07-10

## 2025-07-10 DIAGNOSIS — K51.30 ULCERATIVE RECTOSIGMOIDITIS WITHOUT COMPLICATION (HCC): ICD-10-CM

## 2025-07-10 RX ORDER — BUDESONIDE 3 MG/1
9 CAPSULE, COATED PELLETS ORAL DAILY
Qty: 90 CAPSULE | Refills: 2 | Status: SHIPPED | OUTPATIENT
Start: 2025-07-10 | End: 2025-10-08

## 2025-07-16 ENCOUNTER — PATIENT MESSAGE (OUTPATIENT)
Dept: GASTROENTEROLOGY | Facility: CLINIC | Age: 40
End: 2025-07-16

## 2025-07-16 DIAGNOSIS — K51.30 ULCERATIVE RECTOSIGMOIDITIS WITHOUT COMPLICATION (HCC): Primary | ICD-10-CM

## 2025-07-16 RX ORDER — PREDNISONE 10 MG/1
40 TABLET ORAL DAILY
Qty: 70 TABLET | Refills: 0 | Status: SHIPPED | OUTPATIENT
Start: 2025-07-16

## 2025-07-23 ENCOUNTER — TELEPHONE (OUTPATIENT)
Age: 40
End: 2025-07-23

## 2025-07-23 ENCOUNTER — TELEPHONE (OUTPATIENT)
Dept: GASTROENTEROLOGY | Facility: CLINIC | Age: 40
End: 2025-07-23

## 2025-07-23 DIAGNOSIS — A04.72 C. DIFFICILE DIARRHEA: Primary | ICD-10-CM

## 2025-07-23 RX ORDER — VANCOMYCIN HYDROCHLORIDE 125 MG/1
125 CAPSULE ORAL 4 TIMES DAILY
Qty: 40 CAPSULE | Refills: 0 | Status: SHIPPED | OUTPATIENT
Start: 2025-07-23 | End: 2025-08-02

## 2025-07-23 NOTE — TELEPHONE ENCOUNTER
Patient calling regarding stool results.  Please review.  Patient is currently on vacation in [place].

## 2025-07-23 NOTE — TELEPHONE ENCOUNTER
Vancomycin sent to local pharmacy.  Pt is in [place]  Requests rx sent to [address] [address]  Phone     Med cancelled at local [facility] and called into [facility] in [place].  Left message on  for pharmacy.

## 2025-07-30 ENCOUNTER — TELEPHONE (OUTPATIENT)
Dept: GASTROENTEROLOGY | Facility: CLINIC | Age: 40
End: 2025-07-30

## 2025-08-04 ENCOUNTER — TELEPHONE (OUTPATIENT)
Age: 40
End: 2025-08-04